# Patient Record
Sex: FEMALE | Race: WHITE | NOT HISPANIC OR LATINO | Employment: OTHER | URBAN - METROPOLITAN AREA
[De-identification: names, ages, dates, MRNs, and addresses within clinical notes are randomized per-mention and may not be internally consistent; named-entity substitution may affect disease eponyms.]

---

## 2017-01-05 DIAGNOSIS — I48.91 ATRIAL FIBRILLATION, UNSPECIFIED TYPE: Primary | ICD-10-CM

## 2017-01-09 ENCOUNTER — OFFICE VISIT (OUTPATIENT)
Dept: ELECTROPHYSIOLOGY | Facility: CLINIC | Age: 82
End: 2017-01-09
Payer: MEDICARE

## 2017-01-09 ENCOUNTER — CLINICAL SUPPORT (OUTPATIENT)
Dept: ELECTROPHYSIOLOGY | Facility: CLINIC | Age: 82
End: 2017-01-09
Payer: MEDICARE

## 2017-01-09 ENCOUNTER — HOSPITAL ENCOUNTER (OUTPATIENT)
Dept: CARDIOLOGY | Facility: CLINIC | Age: 82
Discharge: HOME | End: 2017-01-09
Payer: MEDICARE

## 2017-01-09 VITALS — HEIGHT: 63 IN | BODY MASS INDEX: 28.35 KG/M2 | WEIGHT: 160 LBS | HEART RATE: 71 BPM

## 2017-01-09 DIAGNOSIS — I48.91 ATRIAL FIBRILLATION: ICD-10-CM

## 2017-01-09 DIAGNOSIS — I44.2 COMPLETE ATRIOVENTRICULAR BLOCK: ICD-10-CM

## 2017-01-09 DIAGNOSIS — I44.2 COMPLETE AV BLOCK: ICD-10-CM

## 2017-01-09 DIAGNOSIS — Z95.0 PRESENCE OF CARDIAC PACEMAKER: ICD-10-CM

## 2017-01-09 DIAGNOSIS — E78.5 HYPERLIPIDEMIA, UNSPECIFIED HYPERLIPIDEMIA TYPE: ICD-10-CM

## 2017-01-09 DIAGNOSIS — K21.9 GASTROESOPHAGEAL REFLUX DISEASE WITHOUT ESOPHAGITIS: ICD-10-CM

## 2017-01-09 DIAGNOSIS — I48.21 PERMANENT ATRIAL FIBRILLATION: Primary | ICD-10-CM

## 2017-01-09 DIAGNOSIS — Z95.0 CARDIAC PACEMAKER IN SITU: ICD-10-CM

## 2017-01-09 DIAGNOSIS — I10 ESSENTIAL HYPERTENSION: ICD-10-CM

## 2017-01-09 DIAGNOSIS — I48.91 ATRIAL FIBRILLATION, UNSPECIFIED TYPE: ICD-10-CM

## 2017-01-09 DIAGNOSIS — G81.91 RIGHT HEMIPARESIS: ICD-10-CM

## 2017-01-09 DIAGNOSIS — I63.312 CEREBRAL INFARCTION DUE TO THROMBOSIS OF LEFT MIDDLE CEREBRAL ARTERY: ICD-10-CM

## 2017-01-09 PROCEDURE — 99214 OFFICE O/P EST MOD 30 MIN: CPT | Mod: S$PBB,,, | Performed by: INTERNAL MEDICINE

## 2017-01-09 PROCEDURE — 99999 PR PBB SHADOW E&M-EST. PATIENT-LVL III: CPT | Mod: PBBFAC,,, | Performed by: INTERNAL MEDICINE

## 2017-01-09 PROCEDURE — 99213 OFFICE O/P EST LOW 20 MIN: CPT | Mod: PBBFAC,25 | Performed by: INTERNAL MEDICINE

## 2017-01-09 PROCEDURE — 93279 PRGRMG DEV EVAL PM/LDLS PM: CPT | Mod: PBBFAC | Performed by: INTERNAL MEDICINE

## 2017-01-09 PROCEDURE — 93010 ELECTROCARDIOGRAM REPORT: CPT | Mod: S$PBB,,, | Performed by: INTERNAL MEDICINE

## 2017-01-09 NOTE — MR AVS SNAPSHOT
[facility]  [address]  [place] [address]  Phone: [phone]  Fax: [phone]                  Mattie Belle   2017 11:40 AM   Office Visit    Description:  Female : 1929   Provider:  Dami Du MD   Department:  [facility]           Reason for Visit     Atrial Fibrillation                To Do List           Future Appointments        Provider Department Dept Phone    2/15/2017 10:40 AM TELEPHONE CHECK, PACEM[facility] [phone]    5/15/2017 8:10 AM LAB, [facility] [phone]    5/15/2017 11:00 AM Yunior Pagan MD [facility] [phone]      Goals (5 Years of Data)     None      [facility]     [facility] Nurse Care Line -  Assistance  Registered nurses in the [facility] provide clinical advisement, health education, appointment booking, and other advisory services.  Call for this free service at [phone].             Medications           Message regarding Medications     Verify the changes and/or additions to your medication regime listed below are the same as discussed with your clinician today.  If any of these changes or additions are incorrect, please notify your healthcare provider.        STOP taking these medications     atorvastatin (LIPITOR) 80 MG tablet TAKE 1 TABLET DAILY    guaifenesin (MUCINEX) 600 mg 12 hr tablet Take 1 tablet (600 mg total) by mouth 2 (two) times daily.    guaifenesin-codeine 100-10 mg/5 ml (TUSSI-ORGANIDIN NR)  mg/5 mL syrup Take 5 mLs by mouth every 6 (six) hours as needed for Cough.    magnesium salicylate-caffeine 162.5-50 mg Tab Take 1 tablet by mouth daily as needed.           Verify that the below list of medications is an accurate representation of the medications you are currently taking.  If none reported, the list may be blank. If incorrect, please contact your healthcare provider. Carry  "this list with you in case of emergency.           Current Medications     ANTIOX #8/OM3/DHA/EPA/LUT/ZEAX (PRESERVISION AREDS 2 ORAL) Take 1 tablet by mouth once daily.      CARAFATE 100 mg/mL suspension TAKE 10 ML (1 GRAM TOTAL) EVERY 6 HOURS    carvedilol (COREG) 25 MG tablet Take 1 tablet (25 mg total) by mouth 2 (two) times daily.    cholecalciferol, vitamin D3, (VITAMIN D3) 5,000 unit Tab Take 1 capsule by mouth once daily.      clopidogrel (PLAVIX) 75 mg tablet TAKE 1 TABLET DAILY    ELIQUIS 5 mg Tab TAKE 1 TABLET TWICE A DAY    nitroGLYCERIN 0.4 MG/DOSE TL SPRY (NITROLINGUAL) 400 mcg/spray spray Place 1 spray under the tongue every 5 (five) minutes as needed for Chest pain (max 3 doses). Max 3 doses    pantoprazole (PROTONIX) 40 MG tablet TAKE 1 TABLET DAILY    ranitidine (ZANTAC) 300 MG capsule     spironolactone (ALDACTONE) 25 MG tablet AT THE START OF THERAPY TAKE ONE-HALF TABLET (12.5MG) DAILY.  LAB WORK IN 10 TO 14 DAYS WITH POSSIBLE DOSE INCREASE    VIT C/VIT E/LUTEIN/MIN/OMEGA-3 (OCUVITE ORAL) Take by mouth.           Clinical Reference Information           Vital Signs - Last Recorded  Most recent update: 1/9/2017 11:57 AM by Nba Hensley MA    Pulse Ht Wt LMP BMI    71 5' 3" (1.6 m) 72.6 kg (160 lb) (LMP Unknown) 28.34 kg/m2      Allergies as of 1/9/2017     No Known Allergies      Immunizations Administered on Date of Encounter - 1/9/2017     None      "
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
## 2017-02-14 RX ORDER — CARVEDILOL 25 MG/1
TABLET ORAL
Qty: 180 TABLET | Refills: 2 | Status: SHIPPED | OUTPATIENT
Start: 2017-02-14 | End: 2017-11-14 | Stop reason: SDUPTHER

## 2017-02-15 ENCOUNTER — CLINICAL SUPPORT (OUTPATIENT)
Dept: ELECTROPHYSIOLOGY | Facility: CLINIC | Age: 82
End: 2017-02-15
Payer: MEDICARE

## 2017-02-15 DIAGNOSIS — I44.2 COMPLETE AV BLOCK: ICD-10-CM

## 2017-02-15 DIAGNOSIS — Z95.0 CARDIAC PACEMAKER IN SITU: ICD-10-CM

## 2017-02-15 PROCEDURE — 93293 PM PHONE R-STRIP DEVICE EVAL: CPT | Mod: PBBFAC | Performed by: INTERNAL MEDICINE

## 2017-03-13 RX ORDER — APIXABAN 5 MG/1
TABLET, FILM COATED ORAL
Qty: 180 TABLET | Refills: 1 | Status: SHIPPED | OUTPATIENT
Start: 2017-03-13 | End: 2017-09-06 | Stop reason: SDUPTHER

## 2017-03-16 ENCOUNTER — CLINICAL SUPPORT (OUTPATIENT)
Dept: ELECTROPHYSIOLOGY | Facility: CLINIC | Age: 82
End: 2017-03-16
Payer: MEDICARE

## 2017-03-16 DIAGNOSIS — Z95.0 CARDIAC PACEMAKER IN SITU: Primary | ICD-10-CM

## 2017-03-16 DIAGNOSIS — I44.2 COMPLETE AV BLOCK: ICD-10-CM

## 2017-03-16 DIAGNOSIS — I48.21 PERMANENT ATRIAL FIBRILLATION: ICD-10-CM

## 2017-03-16 PROCEDURE — 93293 PM PHONE R-STRIP DEVICE EVAL: CPT | Mod: PBBFAC | Performed by: INTERNAL MEDICINE

## 2017-03-28 ENCOUNTER — TELEPHONE (OUTPATIENT)
Dept: INTERNAL MEDICINE | Facility: CLINIC | Age: 82
End: 2017-03-28

## 2017-03-28 NOTE — TELEPHONE ENCOUNTER
Spoke with [facility] Dept whom stated patient's Eliquis has been approved. Daughter (Radha) notified.

## 2017-03-28 NOTE — TELEPHONE ENCOUNTER
----- Message from Ammy Soto sent at 3/28/2017  8:15 AM CDT -----  Contact: tyler daughter  Mattie Belle  MRN: [id number]  : 1929  PCP: Yunior Pagan  Home Phone      [phone]  Work Phone      Not on file.  [place]          [phone]      MESSAGE: [facility] want us to call them concerning scrip  eliquis at  [phone]  Been trying to get meds since the ------------[phone]

## 2017-04-20 ENCOUNTER — CLINICAL SUPPORT (OUTPATIENT)
Dept: ELECTROPHYSIOLOGY | Facility: CLINIC | Age: 82
End: 2017-04-20
Payer: MEDICARE

## 2017-04-20 DIAGNOSIS — I44.2 COMPLETE AV BLOCK: ICD-10-CM

## 2017-04-20 DIAGNOSIS — I48.21 PERMANENT ATRIAL FIBRILLATION: ICD-10-CM

## 2017-04-20 DIAGNOSIS — Z95.0 CARDIAC PACEMAKER IN SITU: ICD-10-CM

## 2017-04-20 PROCEDURE — 93293 PM PHONE R-STRIP DEVICE EVAL: CPT | Mod: PBBFAC | Performed by: INTERNAL MEDICINE

## 2017-05-08 ENCOUNTER — TELEPHONE (OUTPATIENT)
Dept: INTERNAL MEDICINE | Facility: CLINIC | Age: 82
End: 2017-05-08

## 2017-05-08 ENCOUNTER — OFFICE VISIT (OUTPATIENT)
Dept: INTERNAL MEDICINE | Facility: CLINIC | Age: 82
End: 2017-05-08
Payer: MEDICARE

## 2017-05-08 VITALS
SYSTOLIC BLOOD PRESSURE: 126 MMHG | RESPIRATION RATE: 16 BRPM | HEART RATE: 69 BPM | BODY MASS INDEX: 28.35 KG/M2 | HEIGHT: 63 IN | WEIGHT: 160 LBS | DIASTOLIC BLOOD PRESSURE: 62 MMHG

## 2017-05-08 DIAGNOSIS — B36.9 FUNGAL DERMATITIS: Primary | ICD-10-CM

## 2017-05-08 DIAGNOSIS — L03.111 CELLULITIS OF RIGHT AXILLA: ICD-10-CM

## 2017-05-08 PROCEDURE — 1157F ADVNC CARE PLAN IN RCRD: CPT | Mod: 8P | Performed by: NURSE PRACTITIONER

## 2017-05-08 PROCEDURE — 1125F AMNT PAIN NOTED PAIN PRSNT: CPT | Performed by: NURSE PRACTITIONER

## 2017-05-08 PROCEDURE — 99999 PR PBB SHADOW E&M-EST. PATIENT-LVL III: CPT | Mod: PBBFAC,,, | Performed by: NURSE PRACTITIONER

## 2017-05-08 PROCEDURE — 99213 OFFICE O/P EST LOW 20 MIN: CPT | Mod: PBBFAC | Performed by: NURSE PRACTITIONER

## 2017-05-08 PROCEDURE — 1159F MED LIST DOCD IN RCRD: CPT | Performed by: NURSE PRACTITIONER

## 2017-05-08 PROCEDURE — 1160F RVW MEDS BY RX/DR IN RCRD: CPT | Performed by: NURSE PRACTITIONER

## 2017-05-08 PROCEDURE — 99213 OFFICE O/P EST LOW 20 MIN: CPT | Mod: S$PBB | Performed by: NURSE PRACTITIONER

## 2017-05-08 RX ORDER — NYSTATIN 100000 [USP'U]/G
POWDER TOPICAL 3 TIMES DAILY
Qty: 120 G | Refills: 1 | Status: SHIPPED | OUTPATIENT
Start: 2017-05-08 | End: 2017-05-20

## 2017-05-08 RX ORDER — CEPHALEXIN 500 MG/1
500 CAPSULE ORAL EVERY 8 HOURS
Qty: 21 CAPSULE | Refills: 0 | Status: SHIPPED | OUTPATIENT
Start: 2017-05-08 | End: 2017-05-15 | Stop reason: SDUPTHER

## 2017-05-08 RX ORDER — FLUCONAZOLE 150 MG/1
150 TABLET ORAL DAILY
Qty: 3 TABLET | Refills: 0 | Status: SHIPPED | OUTPATIENT
Start: 2017-05-08 | End: 2017-05-11

## 2017-05-08 NOTE — TELEPHONE ENCOUNTER
----- Message from Ammy Soto sent at 2017  9:58 AM CDT -----  Contact: daughter tyler Belle  MRN: [id number]  : 1929  PCP: Yunior Pagan  Home Phone      [phone]  Work Phone      Not on file.  [employer]          [phone]      MESSAGE: appt today [facility]  [phone]

## 2017-05-08 NOTE — MR AVS SNAPSHOT
[facility]  [address] [facility] [address]  Phone: [phone]  Fax: [phone]                  Mattie Belle   2017 1:15 PM   Office Visit    Description:  Female : 1929   Provider:  Tori Maradiaga NP   Department:  [facility]           Reason for Visit     YEAST INFECTION           Diagnoses this Visit        Comments    Fungal dermatitis    -  Primary     Cellulitis of right axilla                To Do List           Future Appointments        Provider Department Dept Phone    5/15/2017 8:10 AM LAB, [facility] [phone]    5/15/2017 11:00 AM Yunior Pagan MD [facility] [phone]    2017 9:40 AM TELEPHONE CHECK, PACEMAKER [address] [facility] [phone]      Goals (5 Years of Data)     None      Follow-Up and Disposition     Return if symptoms worsen or fail to improve.       These Medications        Disp Refills Start End    cephALEXin (KEFLEX) 500 MG capsule 21 capsule 0 2017 5/15/2017    Take 1 capsule (500 mg total) by mouth every 8 (eight) hours. - Oral    Pharmacy: [address] Ph #: [phone]       fluconazole (DIFLUCAN) 150 MG Tab 3 tablet 0 2017    Take 1 tablet (150 mg total) by mouth once daily. - Oral    Pharmacy: [address] Ph #: [phone]       nystatin (MYCOSTATIN) powder 120 g 1 2017     Apply topically 3 (three) times daily. - Topical (Top)    Pharmacy: [address] Ph #: [phone]         [facility] On Call     [facility] On Call Nurse Care Line - 24/7 Assistance  Unless otherwise directed by your provider, please contact [facility], our nurse care line that is available for 24/7 assistance.     Registered nurses in the [facility] provide: appointment  scheduling, clinical advisement, health education, and other advisory services.  Call: [phone] (toll free)               Medications           Message regarding Medications     Verify the changes and/or additions to your medication regime listed below are the same as discussed with your clinician today.  If any of these changes or additions are incorrect, please notify your healthcare provider.        START taking these NEW medications        Refills    cephALEXin (KEFLEX) 500 MG capsule 0    Sig: Take 1 capsule (500 mg total) by mouth every 8 (eight) hours.    Class: Normal    Route: Oral    fluconazole (DIFLUCAN) 150 MG Tab 0    Sig: Take 1 tablet (150 mg total) by mouth once daily.    Class: Normal    Route: Oral    nystatin (MYCOSTATIN) powder 1    Sig: Apply topically 3 (three) times daily.    Class: Print    Route: Topical (Top)      STOP taking these medications     CARAFATE 100 mg/mL suspension TAKE 10 ML (1 GRAM TOTAL) EVERY 6 HOURS    ANTIOX #8/OM3/DHA/EPA/LUT/ZEAX (PRESERVISION AREDS 2 ORAL) Take 1 tablet by mouth once daily.             Verify that the below list of medications is an accurate representation of the medications you are currently taking.  If none reported, the list may be blank. If incorrect, please contact your healthcare provider. Carry this list with you in case of emergency.           Current Medications     carvedilol (COREG) 25 MG tablet TAKE 1 TABLET TWICE A DAY    cholecalciferol, vitamin D3, (VITAMIN D3) 5,000 unit Tab Take 1 capsule by mouth once daily.      clopidogrel (PLAVIX) 75 mg tablet TAKE 1 TABLET DAILY    ELIQUIS 5 mg Tab TAKE 1 TABLET TWICE A DAY    pantoprazole (PROTONIX) 40 MG tablet TAKE 1 TABLET DAILY    ranitidine (ZANTAC) 300 MG capsule Take 300 mg by mouth Daily.     spironolactone (ALDACTONE) 25 MG tablet AT THE START OF THERAPY TAKE ONE-HALF TABLET (12.5MG) DAILY.  LAB WORK IN 10 TO 14 DAYS WITH POSSIBLE DOSE INCREASE    VIT C/VIT E/LUTEIN/MIN/OMEGA-3  "(OCUVITE ORAL) Take by mouth.    cephALEXin (KEFLEX) 500 MG capsule Take 1 capsule (500 mg total) by mouth every 8 (eight) hours.    fluconazole (DIFLUCAN) 150 MG Tab Take 1 tablet (150 mg total) by mouth once daily.    nitroGLYCERIN 0.4 MG/DOSE TL SPRY (NITROLINGUAL) 400 mcg/spray spray Place 1 spray under the tongue every 5 (five) minutes as needed for Chest pain (max 3 doses). Max 3 doses    nystatin (MYCOSTATIN) powder Apply topically 3 (three) times daily.           Clinical Reference Information           Your Vitals Were     BP Pulse Resp Height Weight Last Period    126/62 (BP Location: Left arm, Patient Position: Sitting, BP Method: Manual) 69 16 5' 3" (1.6 m) 72.6 kg (160 lb) (LMP Unknown)    BMI                28.34 kg/m2          Blood Pressure          Most Recent Value    BP  126/62      Allergies as of 5/8/2017     No Known Allergies      Immunizations Administered on Date of Encounter - 5/8/2017     None      Language Assistance Services     ATTENTION: Language assistance services are available, free of charge. Please call [phone].      ATENCIÓN: Si habla karel, tiene a benson disposición servicios gratuitos de asistencia lingüística. Llame al [phone].     TIFFANIE Ý: N?u b?n nói Ti?ng Vi?t, có các d?ch v? h? tr? ngôn ng? mi?n phí dành cho b?n. G?i s? [phone].         [facility] complies with applicable Federal civil rights laws and does not discriminate on the basis of race, color, national origin, age, disability, or sex.        "

## 2017-05-08 NOTE — PROGRESS NOTES
Subjective:       Patient ID: Mattie Belle is a 88 y.o. female.    Chief Complaint: YEAST INFECTION (Yeast under breats and on right arm)    HPI: pt new to me presents with c/o drainage from under breast and right arm. Reports seen by After hours clinic and dx with yeast infection and treated with nystatin powder. She is contracted to the right arm secondary to h/o cva.     Review of Systems   Constitutional: Negative for chills, diaphoresis, fatigue and fever.   Respiratory: Negative for cough, shortness of breath and wheezing.    Cardiovascular: Negative for chest pain.   Gastrointestinal: Negative for abdominal distention, abdominal pain, constipation, diarrhea, nausea and vomiting.   Musculoskeletal: Negative for arthralgias, back pain and myalgias.   Skin: Positive for rash.   Neurological: Negative for headaches.   Psychiatric/Behavioral: Negative for sleep disturbance.       Objective:      Physical Exam   Constitutional: She is oriented to person, place, and time. She appears well-developed and well-nourished.   HENT:   Head: Normocephalic and atraumatic.   Cardiovascular: Normal rate, regular rhythm and normal heart sounds.    Pulmonary/Chest: Effort normal and breath sounds normal.   Musculoskeletal: She exhibits no edema.   No passive rom to right shoulder. i can barely get any airspace in between her right arm and her lateral side/breast.    Neurological: She is alert and oriented to person, place, and time.   Skin: Skin is warm and dry.        Psychiatric: She has a normal mood and affect. Her behavior is normal. Judgment and thought content normal.   Nursing note and vitals reviewed.      Assessment:       1. Fungal dermatitis    2. Cellulitis of right axilla        Plan:   1. Fungal dermatitis  Advised on keeping dry and keeping cool.   - fluconazole (DIFLUCAN) 150 MG Tab; Take 1 tablet (150 mg total) by mouth once daily.  Dispense: 3 tablet; Refill: 0  - nystatin (MYCOSTATIN) powder; Apply topically  3 (three) times daily.  Dispense: 120 g; Refill: 1    2. Cellulitis of right axilla  If any worse, please call. Any fevers, seek treatment.   - cephALEXin (KEFLEX) 500 MG capsule; Take 1 capsule (500 mg total) by mouth every 8 (eight) hours.  Dispense: 21 capsule; Refill: 0

## 2017-05-15 ENCOUNTER — LAB VISIT (OUTPATIENT)
Dept: LAB | Facility: HOSPITAL | Age: 82
End: 2017-05-15
Attending: INTERNAL MEDICINE
Payer: MEDICARE

## 2017-05-15 ENCOUNTER — OFFICE VISIT (OUTPATIENT)
Dept: INTERNAL MEDICINE | Facility: CLINIC | Age: 82
End: 2017-05-15
Payer: MEDICARE

## 2017-05-15 VITALS
RESPIRATION RATE: 14 BRPM | DIASTOLIC BLOOD PRESSURE: 52 MMHG | HEIGHT: 63 IN | SYSTOLIC BLOOD PRESSURE: 100 MMHG | OXYGEN SATURATION: 92 % | WEIGHT: 160.06 LBS | HEART RATE: 70 BPM | BODY MASS INDEX: 28.36 KG/M2

## 2017-05-15 DIAGNOSIS — G81.91 RIGHT HEMIPARESIS: ICD-10-CM

## 2017-05-15 DIAGNOSIS — E78.5 HYPERLIPIDEMIA, UNSPECIFIED HYPERLIPIDEMIA TYPE: ICD-10-CM

## 2017-05-15 DIAGNOSIS — I10 ESSENTIAL HYPERTENSION: Primary | ICD-10-CM

## 2017-05-15 DIAGNOSIS — I10 ESSENTIAL HYPERTENSION: ICD-10-CM

## 2017-05-15 DIAGNOSIS — L03.113 CELLULITIS OF ARM, RIGHT: ICD-10-CM

## 2017-05-15 DIAGNOSIS — K21.9 GASTROESOPHAGEAL REFLUX DISEASE WITHOUT ESOPHAGITIS: ICD-10-CM

## 2017-05-15 DIAGNOSIS — I48.91 ATRIAL FIBRILLATION, UNSPECIFIED TYPE: ICD-10-CM

## 2017-05-15 DIAGNOSIS — L03.111 CELLULITIS OF RIGHT AXILLA: ICD-10-CM

## 2017-05-15 DIAGNOSIS — D64.9 ANEMIA, UNSPECIFIED TYPE: ICD-10-CM

## 2017-05-15 LAB
ALBUMIN SERPL BCP-MCNC: 3.3 G/DL
ALP SERPL-CCNC: 70 U/L
ALT SERPL W/O P-5'-P-CCNC: 10 U/L
ANION GAP SERPL CALC-SCNC: 9 MMOL/L
AST SERPL-CCNC: 16 U/L
BASOPHILS # BLD AUTO: 0.02 K/UL
BASOPHILS NFR BLD: 0.3 %
BILIRUB SERPL-MCNC: 0.7 MG/DL
BUN SERPL-MCNC: 14 MG/DL
CALCIUM SERPL-MCNC: 9.4 MG/DL
CHLORIDE SERPL-SCNC: 103 MMOL/L
CHOLEST/HDLC SERPL: 2.1 {RATIO}
CO2 SERPL-SCNC: 28 MMOL/L
CREAT SERPL-MCNC: 1.1 MG/DL
DIFFERENTIAL METHOD: ABNORMAL
EOSINOPHIL # BLD AUTO: 0.1 K/UL
EOSINOPHIL NFR BLD: 1.4 %
ERYTHROCYTE [DISTWIDTH] IN BLOOD BY AUTOMATED COUNT: 15.3 %
EST. GFR  (AFRICAN AMERICAN): 52 ML/MIN/1.73 M^2
EST. GFR  (NON AFRICAN AMERICAN): 45 ML/MIN/1.73 M^2
GLUCOSE SERPL-MCNC: 115 MG/DL
HCT VFR BLD AUTO: 32.1 %
HDL/CHOLESTEROL RATIO: 47.3 %
HDLC SERPL-MCNC: 35 MG/DL
HDLC SERPL-MCNC: 74 MG/DL
HGB BLD-MCNC: 9.9 G/DL
LDLC SERPL CALC-MCNC: 26.4 MG/DL
LYMPHOCYTES # BLD AUTO: 1.1 K/UL
LYMPHOCYTES NFR BLD: 15.8 %
MCH RBC QN AUTO: 27.9 PG
MCHC RBC AUTO-ENTMCNC: 30.8 %
MCV RBC AUTO: 90 FL
MONOCYTES # BLD AUTO: 0.5 K/UL
MONOCYTES NFR BLD: 7.8 %
NEUTROPHILS # BLD AUTO: 5.2 K/UL
NEUTROPHILS NFR BLD: 74.7 %
NONHDLC SERPL-MCNC: 39 MG/DL
PLATELET # BLD AUTO: 266 K/UL
PMV BLD AUTO: 9.8 FL
POTASSIUM SERPL-SCNC: 4.6 MMOL/L
PROT SERPL-MCNC: 6.7 G/DL
RBC # BLD AUTO: 3.55 M/UL
SODIUM SERPL-SCNC: 140 MMOL/L
TRIGL SERPL-MCNC: 63 MG/DL
TSH SERPL DL<=0.005 MIU/L-ACNC: 2.52 UIU/ML
WBC # BLD AUTO: 6.9 K/UL

## 2017-05-15 PROCEDURE — 99999 PR PBB SHADOW E&M-EST. PATIENT-LVL III: CPT | Mod: PBBFAC,,, | Performed by: INTERNAL MEDICINE

## 2017-05-15 PROCEDURE — 99214 OFFICE O/P EST MOD 30 MIN: CPT | Mod: S$PBB | Performed by: INTERNAL MEDICINE

## 2017-05-15 PROCEDURE — 99213 OFFICE O/P EST LOW 20 MIN: CPT | Mod: PBBFAC | Performed by: INTERNAL MEDICINE

## 2017-05-15 PROCEDURE — 1157F ADVNC CARE PLAN IN RCRD: CPT | Mod: 8P | Performed by: INTERNAL MEDICINE

## 2017-05-15 PROCEDURE — 1159F MED LIST DOCD IN RCRD: CPT | Performed by: INTERNAL MEDICINE

## 2017-05-15 PROCEDURE — 1160F RVW MEDS BY RX/DR IN RCRD: CPT | Performed by: INTERNAL MEDICINE

## 2017-05-15 RX ORDER — CEPHALEXIN 500 MG/1
500 CAPSULE ORAL EVERY 8 HOURS
Qty: 21 CAPSULE | Refills: 0 | Status: SHIPPED | OUTPATIENT
Start: 2017-05-15 | End: 2017-05-22

## 2017-05-15 RX ORDER — FERROUS SULFATE 325(65) MG
325 TABLET ORAL 3 TIMES DAILY
Qty: 90 TABLET | Refills: 5 | Status: SHIPPED | OUTPATIENT
Start: 2017-05-15 | End: 2017-06-14

## 2017-05-15 RX ORDER — CEPHALEXIN 500 MG/1
500 CAPSULE ORAL 3 TIMES DAILY
Qty: 30 CAPSULE | Refills: 0 | Status: SHIPPED | OUTPATIENT
Start: 2017-05-15 | End: 2017-05-26

## 2017-05-15 NOTE — PROGRESS NOTES
Subjective:       Patient ID: Mattie Belle is a 88 y.o. female.    Chief Complaint: Hyperlipidemia (follow up with lab review) and Hypertension    HPI Comments: Mattie Belle is a 88 y.o. female who presents for ch anemia, CHF ,  Hypertension, and Hyperlipidemia follow up. Labs were reviewed with patient today.  Anemia highlighted with patient and daughter . Not willing to do scopes and work up     Hyperlipidemia   This is a chronic problem. The problem is controlled. Recent lipid tests were reviewed and are low. She has no history of obesity. Pertinent negatives include no chest pain, myalgias or shortness of breath.   Hypertension   This is a chronic problem. The current episode started more than 1 year ago. The problem is unchanged. The problem is controlled. Pertinent negatives include no blurred vision, chest pain, headaches, orthopnea, palpitations, PND or shortness of breath. Past treatments include beta blockers. The current treatment provides moderate improvement. Compliance problems include diet.    Medication Refill   Associated symptoms include a rash and weakness. Pertinent negatives include no abdominal pain, arthralgias, chest pain, chills, congestion, coughing, diaphoresis, fatigue, fever, headaches, myalgias, nausea, numbness, sore throat or vomiting.   Gastroesophageal Reflux   She reports no abdominal pain, no chest pain, no choking, no coughing, no nausea, no sore throat or no wheezing. Pertinent negatives include no fatigue.     Review of Systems   Constitutional: Negative for chills, diaphoresis, fatigue and fever.   HENT: Negative for congestion and sore throat.    Eyes: Negative for blurred vision.   Respiratory: Negative for cough, choking, shortness of breath and wheezing.    Cardiovascular: Negative for chest pain, palpitations, orthopnea and PND.   Gastrointestinal: Negative for abdominal distention, abdominal pain, constipation, diarrhea, nausea and vomiting.   Musculoskeletal: Negative  for arthralgias, back pain and myalgias.   Skin: Positive for rash.   Neurological: Positive for weakness. Negative for numbness and headaches.   Psychiatric/Behavioral: Negative for sleep disturbance.       Objective:      Physical Exam   Constitutional: She is oriented to person, place, and time. She appears well-developed and well-nourished.   HENT:   Head: Normocephalic and atraumatic.   Cardiovascular: Normal rate, regular rhythm and normal heart sounds.    Pulmonary/Chest: Effort normal and breath sounds normal.   Musculoskeletal: She exhibits no edema.   No passive rom to right shoulder. i can barely get any airspace in between her right arm and her lateral side/breast.    Neurological: She is alert and oriented to person, place, and time.   Skin: Skin is warm and dry.        mild erythema left    Psychiatric: She has a normal mood and affect. Her behavior is normal. Judgment and thought content normal.   Nursing note and vitals reviewed.      Assessment:       1. Essential hypertension    2. Hyperlipidemia, unspecified hyperlipidemia type    3. Atrial fibrillation, unspecified type    4. Anemia, unspecified type        Plan:   Mattie was seen today for hyperlipidemia and hypertension.    Diagnoses and all orders for this visit:    Essential hypertension  -     CBC auto differential; Future  -     Comprehensive metabolic panel; Future    Well controlled.  Continue same medication and dose.  1. Keep weight close to ideal body weight.   2.   Avoid high salt foods (olives, pickles, smoked meats, salted potato chips, etc.).   Do not add salt to your food at the table.   Use only small amounts of salt when cooking.   3. Begin an exercise program. Discuss with your doctor what type of exercise program would be best for you. It doesn't have to be difficult. Even brisk walking for 20 minutes three times a week is a good form of exercise.   4. Avoid medicines which contain heart stimulants. This includes many cold and  sinus decongestant pills and sprays as well as diet pills. Check the warnings about hypertension on the label. Stimulants such as amphetamine or cocaine could be lethal for someone with hypertension. Never take these.    Hyperlipidemia, unspecified hyperlipidemia type  -     Lipid panel; Future  -     TSH; Future  Limit the cholesterol in your diet to less than 300 mg per day.   Fats should contribute no more than 20 to 35% of your daily calories.   Less than 7 to 10% of your calories should come from saturated fat.   Avoid saturated fat products e.g., Butter, some oils, meat, and poultry fat contain a lot of saturated fat.   Check food labels for fat and cholesterol content. Choose the foods with less fat per serving.   Limit the amount of butter and margarine you eat.   Use salad dressings and margarine made with polyunsaturated and monounsaturated fats.   Use egg whites or egg substitutes rather than whole eggs.   Replace whole-milk dairy products with nonfat or low-fat milk, cheese, spreads, and yogurt.   Eat skinless chicken, turkey, fish, and meatless entrees more often than red meat.   Choose lean cuts of meat and trim off all visible fat. Keep portion sizes moderate.   Avoid fatty desserts such as ice cream, cream-filled cakes, and cheesecakes. Choose fresh fruits, nonfat frozen yogurt, Popsicles, etc.   Reduce the amount of fried foods, vending machine food, and fast food you eat.   Eat fruits and vegetables (especially fresh fruits and leafy vegetables), beans, and whole grains daily. The fiber in these foods helps lower cholesterol.   Look for low-fat or nonfat varieties of the foods you like to eat, or look for substitutes.   You may need to exercise 60 minutes a day to prevent weight gain and 90 minutes a day to lose weight.  Atrial fibrillation, unspecified type  -     TSH; Future  The current medical regimen is effective;  continue present plan and medications.  Anemia, unspecified type  -     ferrous  sulfate 325 mg (65 mg iron) Tab tablet; Take 1 tablet (325 mg total) by mouth 3 (three) times daily.  -     CBC auto differential; Future  She will not do work up    Cellulitis of arm, right  -     cephALEXin (KEFLEX) 500 MG capsule; Take 1 capsule (500 mg total) by mouth 3 (three) times daily.

## 2017-05-15 NOTE — MR AVS SNAPSHOT
[facility]  [address] [facility] [address]  Phone: [phone]  Fax: [phone]                  Mattie Belle   5/15/2017 11:00 AM   Office Visit    Description:  Female : 1929   Provider:  Yunior Pagan MD   Department:  [facility]           Reason for Visit     Hyperlipidemia     Hypertension           Diagnoses this Visit        Comments    Essential hypertension    -  Primary     Hyperlipidemia, unspecified hyperlipidemia type         Atrial fibrillation, unspecified type         Anemia, unspecified type         Cellulitis of arm, right                To Do List           Future Appointments        Provider Department Dept Phone    5/15/2017 11:00 AM Yunior Pagan MD [facility] [phone]    2017 9:40 AM TELEPHONE CHECK, PACEMAKER [facility] - Arrhythmia [phone]    11/15/2017 8:30 AM NURSE, Health system [phone]    2017 10:30 AM Yunior Pagan MD [facility] [phone]      Goals (5 Years of Data)     None      Follow-Up and Disposition     Return in about 6 months (around 11/15/2017).    Follow-up and Disposition History       These Medications        Disp Refills Start End    ferrous sulfate 325 mg (65 mg iron) Tab tablet 90 tablet 5 5/15/2017 2017    Take 1 tablet (325 mg total) by mouth 3 (three) times daily. - Oral    Pharmacy: [facility] - [address] Ph #: [phone]       cephALEXin (KEFLEX) 500 MG capsule 30 capsule 0 5/15/2017 2017    Take 1 capsule (500 mg total) by mouth 3 (three) times daily. - Oral    Pharmacy: [facility] - [address] Ph #: [phone]         [facility]     [facility] -  Assistance  Unless otherwise directed by your provider, please contact [facility], our nurse care line that is  available for 24/7 assistance.     Registered nurses in the [facility] provide: appointment scheduling, clinical advisement, health education, and other advisory services.  Call: [phone] (toll free)               Medications           Message regarding Medications     Verify the changes and/or additions to your medication regime listed below are the same as discussed with your clinician today.  If any of these changes or additions are incorrect, please notify your healthcare provider.        START taking these NEW medications        Refills    ferrous sulfate 325 mg (65 mg iron) Tab tablet 5    Sig: Take 1 tablet (325 mg total) by mouth 3 (three) times daily.    Class: Normal    Route: Oral    cephALEXin (KEFLEX) 500 MG capsule 0    Sig: Take 1 capsule (500 mg total) by mouth 3 (three) times daily.    Class: Normal    Route: Oral           Verify that the below list of medications is an accurate representation of the medications you are currently taking.  If none reported, the list may be blank. If incorrect, please contact your healthcare provider. Carry this list with you in case of emergency.           Current Medications     carvedilol (COREG) 25 MG tablet TAKE 1 TABLET TWICE A DAY    cephALEXin (KEFLEX) 500 MG capsule Take 1 capsule (500 mg total) by mouth every 8 (eight) hours.    cholecalciferol, vitamin D3, (VITAMIN D3) 5,000 unit Tab Take 1 capsule by mouth once daily.      clopidogrel (PLAVIX) 75 mg tablet TAKE 1 TABLET DAILY    ELIQUIS 5 mg Tab TAKE 1 TABLET TWICE A DAY    nystatin (MYCOSTATIN) powder Apply topically 3 (three) times daily.    pantoprazole (PROTONIX) 40 MG tablet TAKE 1 TABLET DAILY    ranitidine (ZANTAC) 300 MG capsule Take 300 mg by mouth Daily.     spironolactone (ALDACTONE) 25 MG tablet AT THE START OF THERAPY TAKE ONE-HALF TABLET (12.5MG) DAILY.  LAB WORK IN 10 TO 14 DAYS WITH POSSIBLE DOSE INCREASE    VIT C/VIT E/LUTEIN/MIN/OMEGA-3 (OCUVITE ORAL) Take by mouth.     "cephALEXin (KEFLEX) 500 MG capsule Take 1 capsule (500 mg total) by mouth 3 (three) times daily.    ferrous sulfate 325 mg (65 mg iron) Tab tablet Take 1 tablet (325 mg total) by mouth 3 (three) times daily.    nitroGLYCERIN 0.4 MG/DOSE TL SPRY (NITROLINGUAL) 400 mcg/spray spray Place 1 spray under the tongue every 5 (five) minutes as needed for Chest pain (max 3 doses). Max 3 doses           Clinical Reference Information           Your Vitals Were     BP Pulse Resp Height Weight Last Period    100/52 70 14 5' 3" (1.6 m) 72.6 kg (160 lb 0.9 oz) (LMP Unknown)    SpO2 BMI             92% 28.35 kg/m2         Blood Pressure          Most Recent Value    BP  (!)  100/52      Allergies as of 5/15/2017     No Known Allergies      Immunizations Administered on Date of Encounter - 5/15/2017     None      Orders Placed During Today's Visit     Future Labs/Procedures Expected by Expires    CBC auto differential  11/11/2017 (Approximate) 5/15/2018    Comprehensive metabolic panel  11/11/2017 (Approximate) 5/15/2018    Lipid panel  11/11/2017 (Approximate) 5/15/2018    TSH  11/11/2017 (Approximate) 5/15/2018      Language Assistance Services     ATTENTION: Language assistance services are available, free of charge. Please call [phone].      ATENCIÓN: Si leah karel, tiene a benson disposición servicios gratuitos de asistencia lingüística. Llame al [phone].     CHÚ Ý: N?u b?n nói Ti?ng Vi?t, có các d?ch v? h? tr? ngôn ng? mi?n phí dành cho b?n. G?i s? [phone].         [facility] complies with applicable Federal civil rights laws and does not discriminate on the basis of race, color, national origin, age, disability, or sex.        "

## 2017-05-18 RX ORDER — CLOPIDOGREL BISULFATE 75 MG/1
TABLET ORAL
Qty: 90 TABLET | Refills: 0 | Status: SHIPPED | OUTPATIENT
Start: 2017-05-18 | End: 2017-08-17 | Stop reason: SDUPTHER

## 2017-05-18 RX ORDER — ATORVASTATIN CALCIUM 80 MG/1
TABLET, FILM COATED ORAL
Qty: 90 TABLET | Refills: 0 | Status: SHIPPED | OUTPATIENT
Start: 2017-05-18 | End: 2017-08-17 | Stop reason: SDUPTHER

## 2017-05-20 ENCOUNTER — HOSPITAL ENCOUNTER (EMERGENCY)
Facility: HOSPITAL | Age: 82
Discharge: HOME | End: 2017-05-20
Attending: SURGERY
Payer: MEDICARE

## 2017-05-20 VITALS
BODY MASS INDEX: 30.11 KG/M2 | WEIGHT: 170 LBS | HEART RATE: 70 BPM | OXYGEN SATURATION: 95 % | RESPIRATION RATE: 16 BRPM | SYSTOLIC BLOOD PRESSURE: 128 MMHG | TEMPERATURE: 97 F | DIASTOLIC BLOOD PRESSURE: 58 MMHG

## 2017-05-20 DIAGNOSIS — L03.90 CELLULITIS, UNSPECIFIED CELLULITIS SITE: ICD-10-CM

## 2017-05-20 DIAGNOSIS — B37.2 YEAST INFECTION OF THE SKIN: Primary | ICD-10-CM

## 2017-05-20 LAB
ALBUMIN SERPL BCP-MCNC: 3.1 G/DL
ALP SERPL-CCNC: 73 U/L
ALT SERPL W/O P-5'-P-CCNC: 8 U/L
ANION GAP SERPL CALC-SCNC: 8 MMOL/L
AST SERPL-CCNC: 14 U/L
BASOPHILS # BLD AUTO: 0.02 K/UL
BASOPHILS NFR BLD: 0.2 %
BILIRUB SERPL-MCNC: 0.7 MG/DL
BUN SERPL-MCNC: 17 MG/DL
CALCIUM SERPL-MCNC: 9.3 MG/DL
CHLORIDE SERPL-SCNC: 105 MMOL/L
CO2 SERPL-SCNC: 27 MMOL/L
CREAT SERPL-MCNC: 1.2 MG/DL
DIFFERENTIAL METHOD: ABNORMAL
EOSINOPHIL # BLD AUTO: 0.2 K/UL
EOSINOPHIL NFR BLD: 1.8 %
ERYTHROCYTE [DISTWIDTH] IN BLOOD BY AUTOMATED COUNT: 16.1 %
EST. GFR  (AFRICAN AMERICAN): 47 ML/MIN/1.73 M^2
EST. GFR  (NON AFRICAN AMERICAN): 40 ML/MIN/1.73 M^2
GLUCOSE SERPL-MCNC: 139 MG/DL
HCT VFR BLD AUTO: 31.7 %
HGB BLD-MCNC: 9.8 G/DL
LYMPHOCYTES # BLD AUTO: 1.2 K/UL
LYMPHOCYTES NFR BLD: 12.3 %
MCH RBC QN AUTO: 28.1 PG
MCHC RBC AUTO-ENTMCNC: 30.9 %
MCV RBC AUTO: 91 FL
MONOCYTES # BLD AUTO: 1 K/UL
MONOCYTES NFR BLD: 10.4 %
NEUTROPHILS # BLD AUTO: 7.1 K/UL
NEUTROPHILS NFR BLD: 75.3 %
PLATELET # BLD AUTO: 226 K/UL
PMV BLD AUTO: 9.9 FL
POTASSIUM SERPL-SCNC: 4.6 MMOL/L
PROT SERPL-MCNC: 6.4 G/DL
RBC # BLD AUTO: 3.49 M/UL
SODIUM SERPL-SCNC: 140 MMOL/L
WBC # BLD AUTO: 9.45 K/UL

## 2017-05-20 PROCEDURE — 96372 THER/PROPH/DIAG INJ SC/IM: CPT

## 2017-05-20 PROCEDURE — 25000003 PHARM REV CODE 250: Performed by: SURGERY

## 2017-05-20 PROCEDURE — 85025 COMPLETE CBC W/AUTO DIFF WBC: CPT

## 2017-05-20 PROCEDURE — 80053 COMPREHEN METABOLIC PANEL: CPT

## 2017-05-20 PROCEDURE — 36415 COLL VENOUS BLD VENIPUNCTURE: CPT

## 2017-05-20 PROCEDURE — 99284 EMERGENCY DEPT VISIT MOD MDM: CPT | Mod: 25

## 2017-05-20 RX ORDER — NYSTATIN 100000 U/G
CREAM TOPICAL 2 TIMES DAILY
Qty: 1 TUBE | Refills: 0 | Status: SHIPPED | OUTPATIENT
Start: 2017-05-20 | End: 2017-06-14 | Stop reason: SDUPTHER

## 2017-05-20 RX ORDER — CLINDAMYCIN HYDROCHLORIDE 300 MG/1
300 CAPSULE ORAL 4 TIMES DAILY
Qty: 28 CAPSULE | Refills: 0 | Status: SHIPPED | OUTPATIENT
Start: 2017-05-20 | End: 2017-05-26 | Stop reason: SINTOL

## 2017-05-20 RX ORDER — CLINDAMYCIN PHOSPHATE 150 MG/ML
600 INJECTION, SOLUTION INTRAVENOUS
Status: COMPLETED | OUTPATIENT
Start: 2017-05-20 | End: 2017-05-20

## 2017-05-20 RX ORDER — FLUCONAZOLE 150 MG/1
150 TABLET ORAL
Status: COMPLETED | OUTPATIENT
Start: 2017-05-20 | End: 2017-05-20

## 2017-05-20 RX ORDER — TRAMADOL HYDROCHLORIDE 50 MG/1
50 TABLET ORAL EVERY 6 HOURS PRN
Qty: 20 TABLET | Refills: 0 | Status: SHIPPED | OUTPATIENT
Start: 2017-05-20 | End: 2017-05-30

## 2017-05-20 RX ORDER — FLUCONAZOLE 100 MG/1
100 TABLET ORAL DAILY
Qty: 5 TABLET | Refills: 0 | Status: SHIPPED | OUTPATIENT
Start: 2017-05-20 | End: 2017-05-25

## 2017-05-20 RX ORDER — ACETAMINOPHEN AND CODEINE PHOSPHATE 300; 30 MG/1; MG/1
1 TABLET ORAL
Status: COMPLETED | OUTPATIENT
Start: 2017-05-20 | End: 2017-05-20

## 2017-05-20 RX ADMIN — ACETAMINOPHEN AND CODEINE PHOSPHATE 1 TABLET: 300; 30 TABLET ORAL at 03:05

## 2017-05-20 RX ADMIN — FLUCONAZOLE 150 MG: 150 TABLET ORAL at 03:05

## 2017-05-20 RX ADMIN — CLINDAMYCIN PHOSPHATE 600 MG: 150 INJECTION, SOLUTION INTRAMUSCULAR; INTRAVENOUS at 03:05

## 2017-05-20 NOTE — ED TRIAGE NOTES
Assumed care of 88 y.o. female presents to ER ED 02/ED 02A   Chief Complaint   Patient presents with    Cellulitis    to right arm. PCP instructed pt to come to ER. No acute distress noted.

## 2017-05-20 NOTE — DISCHARGE INSTRUCTIONS
Candida Skin Infection (Adult)  Candida is type of yeast. It grows naturally on the skin and in the mouth. If it grows out of control, it can cause an infection. Candida can cause infections in the genital area, skin folds, in the mouth, and under the breasts. Anyone can get this infection. It is more common in a person with a weak immune system, such as from diabetes, HIV, or cancer. Its also more common in someone who has been on antibiotic therapy. And its more common people who are overweight or who have incontinence. Wearing tight-fitting clothing and taking part in activities with lots of skin-to-skin contact can also put you at risk.  Candida causes the skin to become bright red and inflamed. The border of the infected part of the skin is often raised. The infection causes pain and itching. Sometimes the skin peels and bleeds. In the mouth, candida is called thrush, and may cause white thickened areas.  A Candida rash is most often treated with an antifungal cream or ointment. The rash will clear a few days after starting the medicine. Infections that dont go away may need a prescription medicine. In rare cases, a bacterial infection can also occur.  Home care  Your healthcare provider will recommend an antifungal cream or ointment for the rash. He or she may also prescribe a medicine for the itch. Follow all instructions for using these medicines. Dont use cornstarch powder. Cornstarch can cause the Candida infection to get worse.  General care:  · Keep your skin clean by washing the area twice a day.  · Use the cream as directed until your rash is gone. Once the skin has healed, keep it dry to prevent another infection.   · If you are overweight, talk with your healthcare provider about a plan to lose excess weight.  · Avoid clothes that fit tightly.  Follow-up care  Follow up with your healthcare provider, or as advised. Your rash will clear in 7 to 14 days. Call your healthcare provider if the rash  is not gone after 14 days.  When to seek medical advice  Call your healthcare provider right away if any of these occur:  · Pain or redness that gets worse or spreads  · Fluid coming from the skin  · Yellow crusts on the skin  · Fever of 100.4°F (38°C) or higher, or as directed by your healthcare provider  Date Last Reviewed: 9/1/2016  © 7976-9691 [employer]. [address] [place] [address], [place] [address]. All rights reserved. This information is not intended as a substitute for professional medical care. Always follow your healthcare professional's instructions.

## 2017-05-20 NOTE — ED PROVIDER NOTES
[facility]                                        May 20, 2017                   Chief Complaint  88 y.o. female with Cellulitis    History of Present Illness  Mattie Belle presents to the emergency room with right arm and chest wall infection chronically  Pt has been treated for several weeks due to a yeast infection of the right arm and chest wall  Patient has been seen by the nurse practitioner & PCP, was on 3 days of Diflucan just recently  Patient states that local wound care did help, however yeast infection reoccurred this week  Patient has contractures the right upper extremity, right arm rubs up against the right chest wall  Continued moistness in the area, daughter is a retired wound care nurse: Difficult to treat yeast    The history is provided by the patient    Past Medical History   -- Atrial fibrillation    -- CHF (congestive heart failure)    -- Hyperlipidemia    -- Hypertension    -- Stroke      Past Surgical History   -- CHOLECYSTECTOMY     -- HYSTERECTOMY     -- INSERT / REPLACE / REMOVE PACEMAKER        No Known Allergies     Review of Systems and Physical Exam     Review of Systems  -- Constitution - no fever, denies fatigue, no weakness, no chills  -- Eyes - no tearing or redness, no visual disturbance  -- Ear, Nose - no tinnitus or earache, no nasal congestion or discharge  -- Mouth,Throat - no sore throat, no toothache, normal voice, normal swallowing  -- Respiratory - denies cough and congestion, no shortness of breath, no GABRIEL  -- Cardiovascular - denies chest pain, no palpitations, denies claudication  -- Gastrointestinal - denies abdominal pain, nausea, vomiting, or diarrhea  -- Musculoskeletal - denies back pain, negative for myalgias and arthralgias   -- Neurological - no headache, denies weakness or seizure; no LOC  -- Skin - yeast infection of the right arm and chest wall    Vital Signs  -- Her blood pressure is 128/58 (abnormal) and her pulse is 70.   -- Her  respiration is 16 and oxygen saturation is 95%.      Physical Exam  -- Nursing note and vitals reviewed  -- Constitutional: Appears well-developed and well-nourished  -- Head: Atraumatic. Normocephalic. No obvious abnormality  -- Eyes: Pupils are equal and reactive to light. Normal conjunctiva and lids  -- Cardiac: Normal rate, regular rhythm and normal heart sounds  -- Pulmonary: Normal respiratory effort, breath sounds clear to auscultation  -- Abdominal: Soft, no tenderness. Normal bowel sounds. Normal liver edge  -- Musculoskeletal: Normal range of motion, no effusions. Joints stable   -- Neurological: No focal deficits. Showed good interaction with staff  -- Vascular: Posterior tibial, dorsalis pedis and radial pulses 2+ bilaterally    -- Lymphatics: No cervical or peripheral lymphadenopathy. No edema noted  -- Skin: Warm and dry. No evidence of rash or cellulitis    Emergency Room Course     Abnormal lab values  -- Glucose 139 (*)   -- Albumin 3.1 (*)   -- ALT 8 (*)   -- eGFR  40 (*)   -- RBC 3.49 (*)   -- Hemoglobin 9.8 (*)   -- Hematocrit 31.7 (*)   -- MCHC 30.9 (*)   -- RDW 16.1 (*)   -- Gran% 75.3 (*)   -- Lymph% 12.3 (*)     Medications Given  -- clindamycin injection 600 mg (600 mg Intramuscular Given 5/20/17 1516)   -- fluconazole tablet 150 mg (150 mg Oral Given 5/20/17 1516)   -- acetaminophen-codeine 300-30mg per tablet 1 tablet (1 tablet Oral Given 5/20/17 1525)     Diagnosis  -- The primary encounter diagnosis was Yeast infection of the skin.   -- A diagnosis of Cellulitis, unspecified cellulitis site was also pertinent to this visit.    Disposition and Plan  -- Disposition: home  -- Condition: stable  -- Follow-up: Patient to follow up with Yunior Pagan MD in 1-2 days.  -- I advised the patient that we have found no life threatening condition today  -- At this time, I believe the patient is clinically stable for discharge.   -- The patient acknowledges that close follow up with a MD is  required   -- Patient agrees to comply with all instruction and direction given in the ER    This note is dictated on Dragon Natural Speaking word recognition program.  There are word recognition mistakes that are occasionally missed on review.           Colin Lowe MD  05/20/17 5932

## 2017-05-20 NOTE — ED AVS SNAPSHOT
[facility]  [address] [facility] [address]               Mattie Belle   2017  1:53 PM   ED    Description:  Female : 1929   Department:  [facility]           Your Care was Coordinated By:     Provider Role From To    Colin Lowe MD Attending Provider [phone] --      Reason for Visit     Cellulitis           Diagnoses this Visit        Comments    Yeast infection of the skin    -  Primary     Cellulitis, unspecified cellulitis site           ED Disposition     ED Disposition Condition Comment    Discharge             To Do List           Follow-up Information     Follow up with Yunior Pagan MD. Schedule an appointment as soon as possible for a visit in 2 days.    Specialty:  Internal Medicine    Contact information:    [address] [place] [id number]  [phone]         These Medications        Disp Refills Start End    fluconazole (DIFLUCAN) 100 MG tablet 5 tablet 0 2017    Take 1 tablet (100 mg total) by mouth once daily. - Oral    Pharmacy: [address] Ph #: [phone]       clindamycin (CLEOCIN) 300 MG capsule 28 capsule 0 2017    Take 1 capsule (300 mg total) by mouth 4 (four) times daily. - Oral    Pharmacy: [address] Ph #: [phone]       nystatin (MYCOSTATIN) cream 1 Tube 0 2017     Apply topically 2 (two) times daily. - Topical (Top)    Pharmacy: [address] Ph #: [phone]       tramadol (ULTRAM) 50 mg tablet 20 tablet 0 2017    Take 1 tablet (50 mg total) by mouth every 6 (six) hours as needed for Pain. - Oral    Pharmacy: [address] Ph #: [phone]         [facility]     [facility] Care [facility] -  Assistance  Unless  otherwise directed by your provider, please contact [facility], our nurse care line that is available for 24/7 assistance.     Registered nurses in the [facility] provide: appointment scheduling, clinical advisement, health education, and other advisory services.  Call: [phone] (toll free)               Medications           Message regarding Medications     Verify the changes and/or additions to your medication regime listed below are the same as discussed with your clinician today.  If any of these changes or additions are incorrect, please notify your healthcare provider.        START taking these NEW medications        Refills    fluconazole (DIFLUCAN) 100 MG tablet 0    Sig: Take 1 tablet (100 mg total) by mouth once daily.    Class: Normal    Route: Oral    clindamycin (CLEOCIN) 300 MG capsule 0    Sig: Take 1 capsule (300 mg total) by mouth 4 (four) times daily.    Class: Normal    Route: Oral    nystatin (MYCOSTATIN) cream 0    Sig: Apply topically 2 (two) times daily.    Class: Normal    Route: Topical (Top)    tramadol (ULTRAM) 50 mg tablet 0    Sig: Take 1 tablet (50 mg total) by mouth every 6 (six) hours as needed for Pain.    Class: Normal    Route: Oral      These medications were administered today        Dose Freq    clindamycin injection 600 mg 600 mg ED 1 Time    Sig: Inject 4 mLs (600 mg total) into the muscle ED 1 Time.    Class: Normal    Route: Intramuscular    fluconazole tablet 150 mg 150 mg ED 1 Time    Sig: Take 1 tablet (150 mg total) by mouth ED 1 Time.    Class: Normal    Route: Oral    acetaminophen-codeine 300-30mg per tablet 1 tablet 1 tablet ED 1 Time    Sig: Take 1 tablet by mouth ED 1 Time.    Class: Normal    Route: Oral      STOP taking these medications     nystatin (MYCOSTATIN) powder Apply topically 3 (three) times daily.           Verify that the below list of medications is an accurate representation of the medications you are currently taking.  If  none reported, the list may be blank. If incorrect, please contact your healthcare provider. Carry this list with you in case of emergency.           Current Medications     acetaminophen-codeine 300-30mg per tablet 1 tablet Take 1 tablet by mouth ED 1 Time.    atorvastatin (LIPITOR) 80 MG tablet TAKE 1 TABLET DAILY    carvedilol (COREG) 25 MG tablet TAKE 1 TABLET TWICE A DAY    cephALEXin (KEFLEX) 500 MG capsule Take 1 capsule (500 mg total) by mouth 3 (three) times daily.    cephALEXin (KEFLEX) 500 MG capsule Take 1 capsule (500 mg total) by mouth every 8 (eight) hours.    cholecalciferol, vitamin D3, (VITAMIN D3) 5,000 unit Tab Take 1 capsule by mouth once daily.      clindamycin (CLEOCIN) 300 MG capsule Take 1 capsule (300 mg total) by mouth 4 (four) times daily.    clopidogrel (PLAVIX) 75 mg tablet TAKE 1 TABLET DAILY    ELIQUIS 5 mg Tab TAKE 1 TABLET TWICE A DAY    ferrous sulfate 325 mg (65 mg iron) Tab tablet Take 1 tablet (325 mg total) by mouth 3 (three) times daily.    fluconazole (DIFLUCAN) 100 MG tablet Take 1 tablet (100 mg total) by mouth once daily.    nitroGLYCERIN 0.4 MG/DOSE TL SPRY (NITROLINGUAL) 400 mcg/spray spray Place 1 spray under the tongue every 5 (five) minutes as needed for Chest pain (max 3 doses). Max 3 doses    nystatin (MYCOSTATIN) cream Apply topically 2 (two) times daily.    pantoprazole (PROTONIX) 40 MG tablet TAKE 1 TABLET DAILY    ranitidine (ZANTAC) 300 MG capsule Take 300 mg by mouth Daily.     ranitidine (ZANTAC) 300 MG capsule TAKE 1 CAPSULE EVERY EVENING    spironolactone (ALDACTONE) 25 MG tablet AT THE START OF THERAPY TAKE ONE-HALF TABLET (12.5MG) DAILY.  LAB WORK IN 10 TO 14 DAYS WITH POSSIBLE DOSE INCREASE    tramadol (ULTRAM) 50 mg tablet Take 1 tablet (50 mg total) by mouth every 6 (six) hours as needed for Pain.    VIT C/VIT E/LUTEIN/MIN/OMEGA-3 (OCUVITE ORAL) Take by mouth.           Clinical Reference Information           Your Vitals Were     BP Pulse Temp Resp  Weight Last Period    128/58 (BP Location: Left arm, Patient Position: Sitting) 70 97 °F (36.1 °C) (Tympanic) 16 77.1 kg (170 lb) (LMP Unknown)    SpO2 BMI             95% 30.11 kg/m2         Allergies as of 5/20/2017     No Known Allergies      Immunizations Administered on Date of Encounter - 5/20/2017     None      ED Micro, Lab, POCT     Start Ordered       Status Ordering Provider    05/20/17 1407 05/20/17 1406  Comprehensive metabolic panel  STAT      Final result     05/20/17 1407 05/20/17 1406  CBC auto differential  STAT      Final result       ED Imaging Orders     None        Discharge Instructions         Candida Skin Infection (Adult)  Candida is type of yeast. It grows naturally on the skin and in the mouth. If it grows out of control, it can cause an infection. Candida can cause infections in the genital area, skin folds, in the mouth, and under the breasts. Anyone can get this infection. It is more common in a person with a weak immune system, such as from diabetes, HIV, or cancer. Its also more common in someone who has been on antibiotic therapy. And its more common people who are overweight or who have incontinence. Wearing tight-fitting clothing and taking part in activities with lots of skin-to-skin contact can also put you at risk.  Candida causes the skin to become bright red and inflamed. The border of the infected part of the skin is often raised. The infection causes pain and itching. Sometimes the skin peels and bleeds. In the mouth, candida is called thrush, and may cause white thickened areas.  A Candida rash is most often treated with an antifungal cream or ointment. The rash will clear a few days after starting the medicine. Infections that dont go away may need a prescription medicine. In rare cases, a bacterial infection can also occur.  Home care  Your healthcare provider will recommend an antifungal cream or ointment for the rash. He or she may also prescribe a medicine for the  itch. Follow all instructions for using these medicines. Dont use cornstarch powder. Cornstarch can cause the Candida infection to get worse.  General care:  · Keep your skin clean by washing the area twice a day.  · Use the cream as directed until your rash is gone. Once the skin has healed, keep it dry to prevent another infection.   · If you are overweight, talk with your healthcare provider about a plan to lose excess weight.  · Avoid clothes that fit tightly.  Follow-up care  Follow up with your healthcare provider, or as advised. Your rash will clear in 7 to 14 days. Call your healthcare provider if the rash is not gone after 14 days.  When to seek medical advice  Call your healthcare provider right away if any of these occur:  · Pain or redness that gets worse or spreads  · Fluid coming from the skin  · Yellow crusts on the skin  · Fever of 100.4°F (38°C) or higher, or as directed by your healthcare provider  Date Last Reviewed: 9/1/2016  © 8677-3054 [employer]. [address] [place] [address]. All rights reserved. This information is not intended as a substitute for professional medical care. Always follow your healthcare professional's instructions.          Your Scheduled Appointments     May 23, 2017  9:40 AM CDT   Telephonic Pacemaker Check with TELEPHONE CHECK, PACEMAKER   [facility] ([facility] )    [address]  [facility] [address]   [phone]            Nov 15, 2017  8:30 AM CST   Nurse Visit with NURSE, [facility]   [facility] ([facility])    [address] [facility] [address]   [phone]            Nov 20, 2017 10:30 AM CST   Established Patient Visit with Yunior Pagan MD   [facility] ([facility])    [address] [facility] [address]   [phone]               [facility] complies with applicable Federal civil rights laws and does not discriminate on  the basis of race, color, national origin, age, disability, or sex.        Language Assistance Services     ATTENTION: Language assistance services are available, free of charge. Please call [phone].      ATENCIÓN: Si leah vinson, tiene a benson disposición servicios gratuitos de asistencia lingüística. Llame al [phone].     CHÚ Ý: N?u b?n nói Ti?ng Vi?t, có các d?ch v? h? tr? ngôn ng? mi?n phí dành cho b?n. G?i s? [phone].

## 2017-05-22 ENCOUNTER — TELEPHONE (OUTPATIENT)
Dept: INTERNAL MEDICINE | Facility: CLINIC | Age: 82
End: 2017-05-22

## 2017-05-22 DIAGNOSIS — R11.0 NAUSEA: Primary | ICD-10-CM

## 2017-05-22 RX ORDER — ONDANSETRON 4 MG/1
4 TABLET, FILM COATED ORAL EVERY 8 HOURS PRN
Qty: 14 TABLET | Refills: 0 | Status: SHIPPED | OUTPATIENT
Start: 2017-05-22 | End: 2017-05-29

## 2017-05-22 NOTE — TELEPHONE ENCOUNTER
----- Message from Amairani Hernandez sent at 2017  9:29 AM CDT -----  Contact: Radha / Daughter  Mattie Belle  MRN: [id number]  : 1929  PCP: Yunior Pagan  Home Phone      [phone]  Work Phone      Not on file.  [place]          [phone]      MESSAGE:   Pt is requesting to have something called in for nausea. She is on antibiotics that is causing her to be nauseous.     Pharmacy: [facility] in [place]    Phone: [phone]

## 2017-05-23 ENCOUNTER — CLINICAL SUPPORT (OUTPATIENT)
Dept: ELECTROPHYSIOLOGY | Facility: CLINIC | Age: 82
End: 2017-05-23
Payer: MEDICARE

## 2017-05-23 DIAGNOSIS — Z95.0 CARDIAC PACEMAKER IN SITU: ICD-10-CM

## 2017-05-23 DIAGNOSIS — I44.2 COMPLETE AV BLOCK: ICD-10-CM

## 2017-05-23 DIAGNOSIS — I48.21 PERMANENT ATRIAL FIBRILLATION: ICD-10-CM

## 2017-05-23 PROCEDURE — 93293 PM PHONE R-STRIP DEVICE EVAL: CPT | Mod: PBBFAC | Performed by: INTERNAL MEDICINE

## 2017-05-26 ENCOUNTER — OFFICE VISIT (OUTPATIENT)
Dept: INTERNAL MEDICINE | Facility: CLINIC | Age: 82
End: 2017-05-26
Payer: MEDICARE

## 2017-05-26 VITALS
HEART RATE: 68 BPM | WEIGHT: 160 LBS | SYSTOLIC BLOOD PRESSURE: 128 MMHG | HEIGHT: 63 IN | RESPIRATION RATE: 16 BRPM | DIASTOLIC BLOOD PRESSURE: 66 MMHG | BODY MASS INDEX: 28.35 KG/M2 | OXYGEN SATURATION: 98 %

## 2017-05-26 DIAGNOSIS — L03.113 CELLULITIS OF ARM, RIGHT: ICD-10-CM

## 2017-05-26 DIAGNOSIS — B37.2 YEAST DERMATITIS: ICD-10-CM

## 2017-05-26 DIAGNOSIS — Z09 FOLLOW UP: Primary | ICD-10-CM

## 2017-05-26 PROCEDURE — 1157F ADVNC CARE PLAN IN RCRD: CPT | Mod: 8P | Performed by: NURSE PRACTITIONER

## 2017-05-26 PROCEDURE — 99214 OFFICE O/P EST MOD 30 MIN: CPT | Mod: PBBFAC | Performed by: NURSE PRACTITIONER

## 2017-05-26 PROCEDURE — 1159F MED LIST DOCD IN RCRD: CPT | Performed by: NURSE PRACTITIONER

## 2017-05-26 PROCEDURE — 99999 PR PBB SHADOW E&M-EST. PATIENT-LVL IV: CPT | Mod: PBBFAC,,, | Performed by: NURSE PRACTITIONER

## 2017-05-26 PROCEDURE — 99213 OFFICE O/P EST LOW 20 MIN: CPT | Mod: S$PBB | Performed by: NURSE PRACTITIONER

## 2017-05-26 RX ORDER — NYSTATIN 100000 [USP'U]/G
POWDER TOPICAL
COMMUNITY
Start: 2017-05-22 | End: 2017-09-25 | Stop reason: SDUPTHER

## 2017-05-26 NOTE — PROGRESS NOTES
Subjective:       Patient ID: Mattie Belle is a 88 y.o. female.    Chief Complaint: Follow-up (ER follow-up)    HPI: pt known to me presents for f/u. Reports taking her last diflucan today. Using topical nystatin and doing much better. Had to go to ER for pain, given shot, but overall, but better.     Review of Systems   Constitutional: Negative for chills, diaphoresis, fatigue and fever.   Respiratory: Negative for cough, shortness of breath and wheezing.    Cardiovascular: Negative for chest pain.   Gastrointestinal: Negative for abdominal distention, abdominal pain, constipation, diarrhea, nausea and vomiting.   Skin: Negative for color change.   Neurological: Negative for headaches.       Objective:      Physical Exam   Constitutional: She is oriented to person, place, and time. She appears well-developed and well-nourished.   HENT:   Head: Normocephalic and atraumatic.   Cardiovascular: Normal rate, regular rhythm and normal heart sounds.    Pulmonary/Chest: Effort normal and breath sounds normal.   Abdominal: Soft. Bowel sounds are normal. There is no tenderness.   Musculoskeletal: She exhibits edema (plus 2 pitting edema, daughter reports dependenet from being in w/c, no sob. ).   Neurological: She is alert and oriented to person, place, and time.   Skin: Skin is warm and dry.        Psychiatric: She has a normal mood and affect. Her behavior is normal. Judgment and thought content normal.   Nursing note and vitals reviewed.      Assessment:       1. Follow up    2. Yeast dermatitis    3. Cellulitis of arm, right        Plan:     1. Follow up      2. Yeast dermatitis  Ok to just do nystatin 2 x daily and then wean down. Good barrier est. They are educated on prevention.     3. Cellulitis of arm, right  Resolved, off a/b.

## 2017-06-14 RX ORDER — NYSTATIN 100000 U/G
CREAM TOPICAL 2 TIMES DAILY
Qty: 1 TUBE | Refills: 0 | Status: SHIPPED | OUTPATIENT
Start: 2017-06-14

## 2017-06-14 NOTE — TELEPHONE ENCOUNTER
----- Message from Ammy Soto sent at 2017 11:51 AM CDT -----  Contact: TOÑITO DAUGHTER  Mattie Belle  MRN: [id number]  : 1929  PCP: Yunior Pagan  Home Phone      [phone]  Work Phone      Not on file.  [employer]          [phone]      MESSAGE: [facility]------[phone]

## 2017-06-27 ENCOUNTER — CLINICAL SUPPORT (OUTPATIENT)
Dept: ELECTROPHYSIOLOGY | Facility: CLINIC | Age: 82
End: 2017-06-27
Payer: MEDICARE

## 2017-06-27 DIAGNOSIS — I44.2 COMPLETE AV BLOCK: ICD-10-CM

## 2017-06-27 DIAGNOSIS — I48.21 PERMANENT ATRIAL FIBRILLATION: ICD-10-CM

## 2017-06-27 DIAGNOSIS — Z95.0 CARDIAC PACEMAKER IN SITU: ICD-10-CM

## 2017-06-27 PROCEDURE — 93293 PM PHONE R-STRIP DEVICE EVAL: CPT | Mod: PBBFAC | Performed by: INTERNAL MEDICINE

## 2017-07-27 ENCOUNTER — CLINICAL SUPPORT (OUTPATIENT)
Dept: ELECTROPHYSIOLOGY | Facility: CLINIC | Age: 82
End: 2017-07-27
Payer: MEDICARE

## 2017-07-27 DIAGNOSIS — I44.2 COMPLETE AV BLOCK: ICD-10-CM

## 2017-07-27 DIAGNOSIS — Z95.0 CARDIAC PACEMAKER IN SITU: ICD-10-CM

## 2017-07-27 DIAGNOSIS — I48.21 PERMANENT ATRIAL FIBRILLATION: ICD-10-CM

## 2017-07-27 PROCEDURE — 93293 PM PHONE R-STRIP DEVICE EVAL: CPT | Mod: PBBFAC | Performed by: INTERNAL MEDICINE

## 2017-08-17 RX ORDER — CLOPIDOGREL BISULFATE 75 MG/1
75 TABLET ORAL DAILY
Qty: 90 TABLET | Refills: 0 | Status: SHIPPED | OUTPATIENT
Start: 2017-08-17 | End: 2017-11-15 | Stop reason: SDUPTHER

## 2017-08-17 RX ORDER — ATORVASTATIN CALCIUM 80 MG/1
80 TABLET, FILM COATED ORAL DAILY
Qty: 90 TABLET | Refills: 0 | Status: SHIPPED | OUTPATIENT
Start: 2017-08-17 | End: 2017-11-15 | Stop reason: SDUPTHER

## 2017-08-29 ENCOUNTER — CLINICAL SUPPORT (OUTPATIENT)
Dept: ELECTROPHYSIOLOGY | Facility: CLINIC | Age: 82
End: 2017-08-29
Payer: MEDICARE

## 2017-08-29 DIAGNOSIS — I44.2 COMPLETE AV BLOCK: ICD-10-CM

## 2017-08-29 DIAGNOSIS — I48.21 PERMANENT ATRIAL FIBRILLATION: ICD-10-CM

## 2017-08-29 DIAGNOSIS — Z95.0 CARDIAC PACEMAKER IN SITU: ICD-10-CM

## 2017-08-29 PROCEDURE — 93293 PM PHONE R-STRIP DEVICE EVAL: CPT | Mod: PBBFAC | Performed by: INTERNAL MEDICINE

## 2017-09-06 RX ORDER — APIXABAN 5 MG/1
TABLET, FILM COATED ORAL
Qty: 180 TABLET | Refills: 1 | Status: SHIPPED | OUTPATIENT
Start: 2017-09-06 | End: 2018-03-04 | Stop reason: SDUPTHER

## 2017-09-14 RX ORDER — PANTOPRAZOLE SODIUM 40 MG/1
TABLET, DELAYED RELEASE ORAL
Qty: 90 TABLET | Refills: 3 | Status: SHIPPED | OUTPATIENT
Start: 2017-09-14 | End: 2018-09-10 | Stop reason: SDUPTHER

## 2017-09-24 DIAGNOSIS — R79.89 ELEVATED BRAIN NATRIURETIC PEPTIDE (BNP) LEVEL: ICD-10-CM

## 2017-09-24 RX ORDER — SPIRONOLACTONE 25 MG/1
TABLET ORAL
Qty: 90 TABLET | Refills: 2 | OUTPATIENT
Start: 2017-09-24

## 2017-09-25 ENCOUNTER — TELEPHONE (OUTPATIENT)
Dept: INTERNAL MEDICINE | Facility: CLINIC | Age: 82
End: 2017-09-25

## 2017-09-25 NOTE — TELEPHONE ENCOUNTER
----- Message from Viola Villagran sent at 2017  8:54 AM CDT -----  Contact: Pt's daughter Radha Belle  MRN: [id number]  : 1929  PCP: Yunior Pagan  Home Phone      [phone]  Work Phone      Not on file.  [employer]          [phone]      MESSAGE:  Needs refill on Nystatin powder. Wants to know if we can call in a large quantity of it because they prefer the larger quantity. Please advise.    PHONE:  [phone]

## 2017-09-26 RX ORDER — NYSTATIN 100000 [USP'U]/G
POWDER TOPICAL
Qty: 60 G | Refills: 5 | Status: SHIPPED | OUTPATIENT
Start: 2017-09-26 | End: 2018-07-02 | Stop reason: SDUPTHER

## 2017-09-27 DIAGNOSIS — R79.89 ELEVATED BRAIN NATRIURETIC PEPTIDE (BNP) LEVEL: ICD-10-CM

## 2017-09-27 RX ORDER — SPIRONOLACTONE 25 MG/1
25 TABLET ORAL DAILY
Qty: 90 TABLET | Refills: 2 | Status: SHIPPED | OUTPATIENT
Start: 2017-09-27 | End: 2018-06-24 | Stop reason: SDUPTHER

## 2017-09-28 ENCOUNTER — CLINICAL SUPPORT (OUTPATIENT)
Dept: ELECTROPHYSIOLOGY | Facility: CLINIC | Age: 82
End: 2017-09-28
Payer: MEDICARE

## 2017-09-28 DIAGNOSIS — I48.91 ATRIAL FIBRILLATION: ICD-10-CM

## 2017-09-28 DIAGNOSIS — I44.2 COMPLETE AV BLOCK: ICD-10-CM

## 2017-09-28 DIAGNOSIS — Z95.0 CARDIAC PACEMAKER IN SITU: Primary | ICD-10-CM

## 2017-09-28 PROCEDURE — 93293 PM PHONE R-STRIP DEVICE EVAL: CPT | Mod: PBBFAC | Performed by: INTERNAL MEDICINE

## 2017-10-11 ENCOUNTER — TELEPHONE (OUTPATIENT)
Dept: INTERNAL MEDICINE | Facility: CLINIC | Age: 82
End: 2017-10-11

## 2017-10-11 RX ORDER — FLUCONAZOLE 50 MG/1
50 TABLET ORAL DAILY
Qty: 1 TABLET | Refills: 1 | Status: SHIPPED | OUTPATIENT
Start: 2017-10-11 | End: 2017-11-10

## 2017-10-11 NOTE — TELEPHONE ENCOUNTER
----- Message from Ammy Soto sent at 10/11/2017  8:52 AM CDT -----  Contact: tyler daughter  Mattie Belle  MRN: [id number]  : 1929  PCP: Yunior Pagan  Home Phone      [phone]  Work Phone      Not on file.  [employer]          [phone]      MESSAGE: need meds for yeast infection--need oral meds only---[phone]   [facility] [place]

## 2017-10-25 ENCOUNTER — CLINICAL SUPPORT (OUTPATIENT)
Dept: ELECTROPHYSIOLOGY | Facility: CLINIC | Age: 82
End: 2017-10-25
Payer: MEDICARE

## 2017-10-25 DIAGNOSIS — I44.2 ATRIOVENTRICULAR BLOCK, COMPLETE: ICD-10-CM

## 2017-10-25 DIAGNOSIS — I48.91 ATRIAL FIBRILLATION: ICD-10-CM

## 2017-10-25 DIAGNOSIS — Z95.0 CARDIAC PACEMAKER IN SITU: ICD-10-CM

## 2017-10-25 PROCEDURE — 93279 PRGRMG DEV EVAL PM/LDLS PM: CPT | Mod: PBBFAC | Performed by: INTERNAL MEDICINE

## 2017-11-14 RX ORDER — CARVEDILOL 25 MG/1
TABLET ORAL
Qty: 180 TABLET | Refills: 2 | Status: SHIPPED | OUTPATIENT
Start: 2017-11-14 | End: 2018-08-10 | Stop reason: SDUPTHER

## 2017-11-15 RX ORDER — CLOPIDOGREL BISULFATE 75 MG/1
TABLET ORAL
Qty: 90 TABLET | Refills: 0 | Status: SHIPPED | OUTPATIENT
Start: 2017-11-15 | End: 2018-02-13 | Stop reason: SDUPTHER

## 2017-11-15 RX ORDER — ATORVASTATIN CALCIUM 80 MG/1
TABLET, FILM COATED ORAL
Qty: 90 TABLET | Refills: 0 | Status: SHIPPED | OUTPATIENT
Start: 2017-11-15 | End: 2018-02-13 | Stop reason: SDUPTHER

## 2017-12-15 ENCOUNTER — CLINICAL SUPPORT (OUTPATIENT)
Dept: ELECTROPHYSIOLOGY | Facility: CLINIC | Age: 82
End: 2017-12-15
Payer: MEDICARE

## 2017-12-15 DIAGNOSIS — I48.91 ATRIAL FIBRILLATION: ICD-10-CM

## 2017-12-15 DIAGNOSIS — Z95.0 CARDIAC PACEMAKER IN SITU: ICD-10-CM

## 2017-12-15 DIAGNOSIS — I44.2 ATRIOVENTRICULAR BLOCK, COMPLETE: ICD-10-CM

## 2017-12-15 PROCEDURE — 93279 PRGRMG DEV EVAL PM/LDLS PM: CPT | Mod: PBBFAC | Performed by: INTERNAL MEDICINE

## 2017-12-18 ENCOUNTER — OFFICE VISIT (OUTPATIENT)
Dept: INTERNAL MEDICINE | Facility: CLINIC | Age: 82
End: 2017-12-18
Payer: MEDICARE

## 2017-12-18 ENCOUNTER — LAB VISIT (OUTPATIENT)
Dept: LAB | Facility: HOSPITAL | Age: 82
End: 2017-12-18
Attending: INTERNAL MEDICINE
Payer: MEDICARE

## 2017-12-18 VITALS
HEIGHT: 63 IN | DIASTOLIC BLOOD PRESSURE: 42 MMHG | HEART RATE: 71 BPM | WEIGHT: 160.06 LBS | BODY MASS INDEX: 28.36 KG/M2 | SYSTOLIC BLOOD PRESSURE: 90 MMHG | RESPIRATION RATE: 16 BRPM | OXYGEN SATURATION: 96 %

## 2017-12-18 DIAGNOSIS — I10 ESSENTIAL HYPERTENSION: ICD-10-CM

## 2017-12-18 DIAGNOSIS — I48.91 ATRIAL FIBRILLATION, UNSPECIFIED TYPE: ICD-10-CM

## 2017-12-18 DIAGNOSIS — I10 ESSENTIAL HYPERTENSION: Primary | ICD-10-CM

## 2017-12-18 DIAGNOSIS — E78.5 HYPERLIPIDEMIA, UNSPECIFIED HYPERLIPIDEMIA TYPE: ICD-10-CM

## 2017-12-18 DIAGNOSIS — D64.9 ANEMIA, UNSPECIFIED TYPE: ICD-10-CM

## 2017-12-18 LAB
ALBUMIN SERPL BCP-MCNC: 3.6 G/DL
ALP SERPL-CCNC: 69 U/L
ALT SERPL W/O P-5'-P-CCNC: 11 U/L
ANION GAP SERPL CALC-SCNC: 8 MMOL/L
AST SERPL-CCNC: 18 U/L
BASOPHILS # BLD AUTO: 0.01 K/UL
BASOPHILS NFR BLD: 0.2 %
BILIRUB SERPL-MCNC: 0.9 MG/DL
BUN SERPL-MCNC: 20 MG/DL
CALCIUM SERPL-MCNC: 9.8 MG/DL
CHLORIDE SERPL-SCNC: 104 MMOL/L
CHOLEST SERPL-MCNC: 89 MG/DL
CHOLEST/HDLC SERPL: 2 {RATIO}
CO2 SERPL-SCNC: 29 MMOL/L
CREAT SERPL-MCNC: 1.1 MG/DL
DIFFERENTIAL METHOD: ABNORMAL
EOSINOPHIL # BLD AUTO: 0.1 K/UL
EOSINOPHIL NFR BLD: 0.8 %
ERYTHROCYTE [DISTWIDTH] IN BLOOD BY AUTOMATED COUNT: 14.7 %
EST. GFR  (AFRICAN AMERICAN): 52 ML/MIN/1.73 M^2
EST. GFR  (NON AFRICAN AMERICAN): 45 ML/MIN/1.73 M^2
GLUCOSE SERPL-MCNC: 103 MG/DL
HCT VFR BLD AUTO: 36.1 %
HDLC SERPL-MCNC: 45 MG/DL
HDLC SERPL: 50.6 %
HGB BLD-MCNC: 11.3 G/DL
LDLC SERPL CALC-MCNC: 33.6 MG/DL
LYMPHOCYTES # BLD AUTO: 1.5 K/UL
LYMPHOCYTES NFR BLD: 24.4 %
MCH RBC QN AUTO: 29.5 PG
MCHC RBC AUTO-ENTMCNC: 31.3 G/DL
MCV RBC AUTO: 94 FL
MONOCYTES # BLD AUTO: 0.5 K/UL
MONOCYTES NFR BLD: 8.5 %
NEUTROPHILS # BLD AUTO: 4 K/UL
NEUTROPHILS NFR BLD: 66.1 %
NONHDLC SERPL-MCNC: 44 MG/DL
PLATELET # BLD AUTO: 206 K/UL
PMV BLD AUTO: 10.7 FL
POTASSIUM SERPL-SCNC: 4.5 MMOL/L
PROT SERPL-MCNC: 6.6 G/DL
RBC # BLD AUTO: 3.83 M/UL
SODIUM SERPL-SCNC: 141 MMOL/L
TRIGL SERPL-MCNC: 52 MG/DL
TSH SERPL DL<=0.005 MIU/L-ACNC: 0.48 UIU/ML
WBC # BLD AUTO: 5.98 K/UL

## 2017-12-18 PROCEDURE — 85025 COMPLETE CBC W/AUTO DIFF WBC: CPT

## 2017-12-18 PROCEDURE — G0008 ADMIN INFLUENZA VIRUS VAC: HCPCS | Mod: PBBFAC

## 2017-12-18 PROCEDURE — 80061 LIPID PANEL: CPT

## 2017-12-18 PROCEDURE — 99213 OFFICE O/P EST LOW 20 MIN: CPT | Mod: PBBFAC,25 | Performed by: INTERNAL MEDICINE

## 2017-12-18 PROCEDURE — 99999 PR STA SHADOW: CPT | Mod: PBBFAC,,, | Performed by: INTERNAL MEDICINE

## 2017-12-18 PROCEDURE — 99999 FLU VACCINE - HIGH DOSE (65+) PRESERVATIVE FREE IM: CPT | Mod: PBBFAC,,,

## 2017-12-18 PROCEDURE — 84443 ASSAY THYROID STIM HORMONE: CPT

## 2017-12-18 PROCEDURE — 99999 PR PBB SHADOW E&M-EST. PATIENT-LVL III: CPT | Mod: PBBFAC,,, | Performed by: INTERNAL MEDICINE

## 2017-12-18 PROCEDURE — 36415 COLL VENOUS BLD VENIPUNCTURE: CPT

## 2017-12-18 PROCEDURE — 99214 OFFICE O/P EST MOD 30 MIN: CPT | Mod: S$PBB | Performed by: INTERNAL MEDICINE

## 2017-12-18 PROCEDURE — 80053 COMPREHEN METABOLIC PANEL: CPT

## 2017-12-18 NOTE — PROGRESS NOTES
Subjective:       Patient ID: Mattie Belle is a 88 y.o. female.    Chief Complaint: Hyperlipidemia (follow up with lab review) and Hypertension    Mattie Belle is a 88 y.o. female who presents for ch anemia, CHF ,  Hypertension, and Hyperlipidemia follow up. Labs were reviewed with patient today.  Anemia highlighted with patient and daughter . Not willing to do scopes and work up       Hyperlipidemia   This is a chronic problem. The problem is controlled. Recent lipid tests were reviewed and are low. She has no history of obesity. Pertinent negatives include no chest pain, myalgias or shortness of breath.   Hypertension   This is a chronic problem. The current episode started more than 1 year ago. The problem is unchanged. The problem is controlled. Pertinent negatives include no blurred vision, chest pain, headaches, orthopnea, palpitations, PND or shortness of breath. Past treatments include beta blockers. The current treatment provides moderate improvement. Compliance problems include diet.    Medication Refill   Associated symptoms include a rash and weakness. Pertinent negatives include no abdominal pain, arthralgias, chest pain, chills, congestion, coughing, diaphoresis, fatigue, fever, headaches, myalgias, nausea, numbness, sore throat or vomiting.   Gastroesophageal Reflux   She reports no abdominal pain, no chest pain, no choking, no coughing, no nausea, no sore throat or no wheezing. Pertinent negatives include no fatigue.     Review of Systems   Constitutional: Negative for chills, diaphoresis, fatigue and fever.   HENT: Negative for congestion and sore throat.    Eyes: Negative for blurred vision.   Respiratory: Negative for cough, choking, shortness of breath and wheezing.    Cardiovascular: Negative for chest pain, palpitations, orthopnea and PND.   Gastrointestinal: Negative for abdominal distention, abdominal pain, constipation, diarrhea, nausea and vomiting.   Musculoskeletal: Negative for  arthralgias, back pain and myalgias.   Skin: Positive for rash.   Neurological: Positive for weakness. Negative for numbness and headaches.   Psychiatric/Behavioral: Negative for sleep disturbance.       Objective:      Physical Exam   Constitutional: She is oriented to person, place, and time. She appears well-developed and well-nourished.   HENT:   Head: Normocephalic and atraumatic.   Cardiovascular: Normal rate, regular rhythm and normal heart sounds.    Pulmonary/Chest: Effort normal and breath sounds normal.   Musculoskeletal: She exhibits no edema.   No passive rom to right shoulder. i can barely get any airspace in between her right arm and her lateral side/breast.    Neurological: She is alert and oriented to person, place, and time.   Skin: Skin is warm and dry.        mild erythema left    Psychiatric: She has a normal mood and affect. Her behavior is normal. Judgment and thought content normal.   Nursing note and vitals reviewed.      Assessment:       1. Essential hypertension    2. Hyperlipidemia, unspecified hyperlipidemia type    3. Atrial fibrillation, unspecified type        Plan:   Mattie was seen today for hyperlipidemia and hypertension.    Diagnoses and all orders for this visit:    Essential hypertension  -     CBC auto differential; Future  -     Comprehensive metabolic panel; Future    Well controlled.  Continue same medication and dose.  1. Keep weight close to ideal body weight.   2.   Avoid high salt foods (olives, pickles, smoked meats, salted potato chips, etc.).   Do not add salt to your food at the table.   Use only small amounts of salt when cooking.   3. Begin an exercise program. Discuss with your doctor what type of exercise program would be best for you. It doesn't have to be difficult. Even brisk walking for 20 minutes three times a week is a good form of exercise.   4. Avoid medicines which contain heart stimulants. This includes many cold and sinus decongestant pills and sprays  as well as diet pills. Check the warnings about hypertension on the label. Stimulants such as amphetamine or cocaine could be lethal for someone with hypertension. Never take these.    Hyperlipidemia, unspecified hyperlipidemia type  -     Lipid panel; Future  -     TSH; Future  -     Brain natriuretic peptide; Future  The current medical regimen is effective;  continue present plan and medications.  Atrial fibrillation, unspecified type  -     TSH; Future  -     Brain natriuretic peptide; Future  The current medical regimen is effective;  continue present plan and medications.

## 2018-01-19 ENCOUNTER — HOSPITAL ENCOUNTER (EMERGENCY)
Facility: HOSPITAL | Age: 83
Discharge: HOME | End: 2018-01-19
Attending: SURGERY
Payer: MEDICARE

## 2018-01-19 VITALS
SYSTOLIC BLOOD PRESSURE: 160 MMHG | RESPIRATION RATE: 17 BRPM | TEMPERATURE: 98 F | OXYGEN SATURATION: 95 % | DIASTOLIC BLOOD PRESSURE: 80 MMHG | WEIGHT: 160 LBS | BODY MASS INDEX: 28.34 KG/M2 | HEART RATE: 75 BPM

## 2018-01-19 DIAGNOSIS — R05.9 COUGH: ICD-10-CM

## 2018-01-19 DIAGNOSIS — J00 ACUTE NASOPHARYNGITIS: Primary | ICD-10-CM

## 2018-01-19 DIAGNOSIS — J06.9 UPPER RESPIRATORY INFECTION: ICD-10-CM

## 2018-01-19 LAB
ALBUMIN SERPL BCP-MCNC: 3.9 G/DL
ALP SERPL-CCNC: 72 U/L
ALT SERPL W/O P-5'-P-CCNC: 12 U/L
ANION GAP SERPL CALC-SCNC: 7 MMOL/L
AST SERPL-CCNC: 20 U/L
BASOPHILS # BLD AUTO: 0.01 K/UL
BASOPHILS NFR BLD: 0.1 %
BILIRUB SERPL-MCNC: 1.1 MG/DL
BUN SERPL-MCNC: 16 MG/DL
CALCIUM SERPL-MCNC: 9.7 MG/DL
CHLORIDE SERPL-SCNC: 102 MMOL/L
CO2 SERPL-SCNC: 29 MMOL/L
CREAT SERPL-MCNC: 1 MG/DL
DIFFERENTIAL METHOD: ABNORMAL
EOSINOPHIL # BLD AUTO: 0.1 K/UL
EOSINOPHIL NFR BLD: 1.5 %
ERYTHROCYTE [DISTWIDTH] IN BLOOD BY AUTOMATED COUNT: 14.3 %
EST. GFR  (AFRICAN AMERICAN): 58 ML/MIN/1.73 M^2
EST. GFR  (NON AFRICAN AMERICAN): 50 ML/MIN/1.73 M^2
FLUAV AG SPEC QL IA: NEGATIVE
FLUBV AG SPEC QL IA: NEGATIVE
GLUCOSE SERPL-MCNC: 123 MG/DL
HCT VFR BLD AUTO: 37.1 %
HGB BLD-MCNC: 12.1 G/DL
LYMPHOCYTES # BLD AUTO: 1.2 K/UL
LYMPHOCYTES NFR BLD: 17.7 %
MCH RBC QN AUTO: 30.6 PG
MCHC RBC AUTO-ENTMCNC: 32.6 G/DL
MCV RBC AUTO: 94 FL
MONOCYTES # BLD AUTO: 0.5 K/UL
MONOCYTES NFR BLD: 6.9 %
NEUTROPHILS # BLD AUTO: 5.1 K/UL
NEUTROPHILS NFR BLD: 73.8 %
PLATELET # BLD AUTO: 200 K/UL
PMV BLD AUTO: 10.4 FL
POTASSIUM SERPL-SCNC: 4.6 MMOL/L
PROT SERPL-MCNC: 7 G/DL
RBC # BLD AUTO: 3.96 M/UL
SODIUM SERPL-SCNC: 138 MMOL/L
SPECIMEN SOURCE: NORMAL
WBC # BLD AUTO: 6.85 K/UL

## 2018-01-19 PROCEDURE — 25000003 PHARM REV CODE 250: Performed by: SURGERY

## 2018-01-19 PROCEDURE — 94640 AIRWAY INHALATION TREATMENT: CPT

## 2018-01-19 PROCEDURE — 80053 COMPREHEN METABOLIC PANEL: CPT

## 2018-01-19 PROCEDURE — 36415 COLL VENOUS BLD VENIPUNCTURE: CPT

## 2018-01-19 PROCEDURE — 87400 INFLUENZA A/B EACH AG IA: CPT | Mod: 59

## 2018-01-19 PROCEDURE — 99284 EMERGENCY DEPT VISIT MOD MDM: CPT | Mod: 25

## 2018-01-19 PROCEDURE — 85025 COMPLETE CBC W/AUTO DIFF WBC: CPT

## 2018-01-19 PROCEDURE — 25000242 PHARM REV CODE 250 ALT 637 W/ HCPCS: Performed by: SURGERY

## 2018-01-19 PROCEDURE — 96372 THER/PROPH/DIAG INJ SC/IM: CPT

## 2018-01-19 PROCEDURE — 63600175 PHARM REV CODE 636 W HCPCS: Performed by: SURGERY

## 2018-01-19 RX ORDER — LEVOFLOXACIN 500 MG/1
500 TABLET, FILM COATED ORAL DAILY
Qty: 7 TABLET | Refills: 0 | Status: SHIPPED | OUTPATIENT
Start: 2018-01-19 | End: 2018-01-26

## 2018-01-19 RX ORDER — LIDOCAINE HYDROCHLORIDE 10 MG/ML
2.1 INJECTION INFILTRATION; PERINEURAL ONCE
Status: COMPLETED | OUTPATIENT
Start: 2018-01-19 | End: 2018-01-19

## 2018-01-19 RX ORDER — PROMETHAZINE HYDROCHLORIDE AND DEXTROMETHORPHAN HYDROBROMIDE 6.25; 15 MG/5ML; MG/5ML
5 SYRUP ORAL EVERY 6 HOURS PRN
Qty: 118 ML | Refills: 0 | Status: SHIPPED | OUTPATIENT
Start: 2018-01-19 | End: 2018-01-29

## 2018-01-19 RX ORDER — CEFTRIAXONE 1 G/1
1 INJECTION, POWDER, FOR SOLUTION INTRAMUSCULAR; INTRAVENOUS ONCE
Status: COMPLETED | OUTPATIENT
Start: 2018-01-19 | End: 2018-01-19

## 2018-01-19 RX ORDER — IPRATROPIUM BROMIDE AND ALBUTEROL SULFATE 2.5; .5 MG/3ML; MG/3ML
3 SOLUTION RESPIRATORY (INHALATION)
Status: COMPLETED | OUTPATIENT
Start: 2018-01-19 | End: 2018-01-19

## 2018-01-19 RX ORDER — CEFTRIAXONE 1 G/1
1 INJECTION, POWDER, FOR SOLUTION INTRAMUSCULAR; INTRAVENOUS ONCE
Status: DISCONTINUED | OUTPATIENT
Start: 2018-01-19 | End: 2018-01-19

## 2018-01-19 RX ORDER — METHYLPREDNISOLONE 4 MG/1
TABLET ORAL
Qty: 1 PACKAGE | Refills: 0 | Status: SHIPPED | OUTPATIENT
Start: 2018-01-19 | End: 2018-07-02 | Stop reason: ALTCHOICE

## 2018-01-19 RX ADMIN — IPRATROPIUM BROMIDE AND ALBUTEROL SULFATE 3 ML: .5; 3 SOLUTION RESPIRATORY (INHALATION) at 06:01

## 2018-01-19 RX ADMIN — CEFTRIAXONE SODIUM 1 G: 1 INJECTION, POWDER, FOR SOLUTION INTRAMUSCULAR; INTRAVENOUS at 06:01

## 2018-01-19 RX ADMIN — LIDOCAINE HYDROCHLORIDE 2.1 ML: 10 INJECTION, SOLUTION INFILTRATION; PERINEURAL at 06:01

## 2018-01-20 NOTE — ED PROVIDER NOTES
[facility]                                         January 19, 2018                   Chief Complaint  88 y.o. female with URI (uri symptoms x 2-3 days)    History of Present Illness  Mattei Belle presents to the emergency room with nasal congestion noted today   Patient on exam has clear nasal drainage with nasal mucosa erythema noted now   Patient has had a dry hacking cough for a week, clear lung sounds bilaterally today  Patient has no nausea vomiting or diarrhea, she is negative for influenza in the ER     The history is provided by the patient     Past Medical History   -- Atrial fibrillation     -- CHF (congestive heart failure)     -- Hyperlipidemia     -- Hypertension     -- Stroke        Past Surgical History   -- CHOLECYSTECTOMY       -- HYSTERECTOMY       -- INSERT / REPLACE / REMOVE PACEMAKER          No Known Allergies     Review of Systems and Physical Exam     Review of Systems  -- Constitution - no fever, denies fatigue, no weakness, no chills  -- Eyes - no tearing or redness, no visual disturbance  -- Ear, Nose - sneezing, nasal congestion and clear discharge   -- Mouth,Throat - no sore throat, no toothache, normal voice, normal swallowing  -- Respiratory - cough and congestion, no shortness of breath, no GABRIEL  -- Cardiovascular - denies chest pain, no palpitations, denies claudication  -- Gastrointestinal - denies abdominal pain, nausea, vomiting, or diarrhea  -- Genitourinary - no dysuria, no denies flank pain, no hematuria or frequency   -- Musculoskeletal - denies back pain, negative for myalgias and arthralgias   -- Neurological - no headache, denies weakness or seizure; no LOC  -- Skin - denies pallor, rash, or changes in skin. no hives or welts noted    Vital Signs  -- Her oral temperature is 97.8 °F (36.6 °C).   -- Her blood pressure is 160/80 and her pulse is 75.   -- Her respiration is 17 and oxygen saturation is 95%.      Physical Exam  -- Nursing note and vitals  reviewed  -- Constitutional: Appears well-developed and well-nourished  -- Head: Atraumatic. Normocephalic. No obvious abnormality  -- Eyes: Pupils are equal and reactive to light. Normal conjunctiva and lids  -- Nose: nasal mucosa erythema and edema; clear nasal discharge noted   -- Throat: Mucous membranes moist, pharynx normal, normal tonsils. No lesions   -- Ears: External ears and TM normal bilaterally. Normal hearing and no drainage  -- Neck: Normal range of motion. Neck supple. No masses, trachea midline  -- Cardiac: Normal rate, regular rhythm and normal heart sounds  -- Pulmonary: Normal respiratory effort, breath sounds clear to auscultation  -- Abdominal: Soft, no tenderness. Normal bowel sounds. Normal liver edge  -- Musculoskeletal: Normal range of motion, no effusions. Joints stable   -- Neurological: No focal deficits. Showed good interaction with staff  -- Vascular: Posterior tibial, dorsalis pedis and radial pulses 2+ bilaterally      Emergency Room Course     Labs     K 4.6      CO2 29   BUN 16   CREATININE 1.0    (H)   ALKPHOS 72   AST 20   ALT 12   BILITOT 1.1 (H)   ALBUMIN 3.9   PROT 7.0   WBC 6.85   HGB 12.1   HCT 37.1      TSH 0.485     Radiology  -- Chest x-ray showed no infiltrate and showed no acute pathology    Additional Work up  -- The influenza screen was negative    Medications Given  -- lidocaine HCL 10 mg/ml (1%) injection 2.1 mL (2.1 mLs Intramuscular Given 1/19/18 1837)   -- albuterol-ipratropium 2.5mg-0.5mg/3mL nebulizer solution 3 mL (3 mLs Nebulization Given 1/19/18 1826)   -- cefTRIAXone injection 1 g (1 g Intramuscular Given 1/19/18 1836)     Diagnosis  -- Acute nasopharyngitis    Disposition and Plan  -- Disposition: home  -- Condition: stable  -- Follow-up: Patient to follow up with Yunior Pagan MD in 1-2 days.  -- I advised the patient that we have found no life threatening condition today  -- At this time, I believe the patient is clinically  stable for discharge.   -- The patient acknowledges that close follow up with a MD is required   -- Patient agrees to comply with all instruction and direction given in the ER    This note is dictated on Dragon Natural Speaking word recognition program.  There are word recognition mistakes that are occasionally missed on review.    '       Colin Lowe MD  01/19/18 2033

## 2018-01-22 ENCOUNTER — TELEPHONE (OUTPATIENT)
Dept: ELECTROPHYSIOLOGY | Facility: CLINIC | Age: 83
End: 2018-01-22

## 2018-01-22 NOTE — TELEPHONE ENCOUNTER
Called pt to cnfirm her appts. Pt's daughter Radha states that she needs an appt no earlier then 10am. I informed Radha that I would get with the device clinic for an appt and I'll give her call back.

## 2018-02-13 DIAGNOSIS — Z95.0 CARDIAC PACEMAKER IN SITU: Primary | ICD-10-CM

## 2018-02-13 DIAGNOSIS — I44.2 ATRIOVENTRICULAR BLOCK, COMPLETE: ICD-10-CM

## 2018-02-13 DIAGNOSIS — I48.21 PERMANENT ATRIAL FIBRILLATION: ICD-10-CM

## 2018-02-14 RX ORDER — CLOPIDOGREL BISULFATE 75 MG/1
TABLET ORAL
Qty: 90 TABLET | Refills: 0 | Status: SHIPPED | OUTPATIENT
Start: 2018-02-14 | End: 2018-05-14 | Stop reason: SDUPTHER

## 2018-02-14 RX ORDER — ATORVASTATIN CALCIUM 80 MG/1
TABLET, FILM COATED ORAL
Qty: 90 TABLET | Refills: 0 | Status: SHIPPED | OUTPATIENT
Start: 2018-02-14 | End: 2018-05-14 | Stop reason: SDUPTHER

## 2018-02-22 ENCOUNTER — HOSPITAL ENCOUNTER (OUTPATIENT)
Dept: CARDIOLOGY | Facility: CLINIC | Age: 83
Discharge: HOME | End: 2018-02-22
Payer: MEDICARE

## 2018-02-22 ENCOUNTER — CLINICAL SUPPORT (OUTPATIENT)
Dept: ELECTROPHYSIOLOGY | Facility: CLINIC | Age: 83
End: 2018-02-22
Payer: MEDICARE

## 2018-02-22 ENCOUNTER — OFFICE VISIT (OUTPATIENT)
Dept: ELECTROPHYSIOLOGY | Facility: CLINIC | Age: 83
End: 2018-02-22
Payer: MEDICARE

## 2018-02-22 VITALS
SYSTOLIC BLOOD PRESSURE: 146 MMHG | HEIGHT: 63 IN | WEIGHT: 160 LBS | BODY MASS INDEX: 28.35 KG/M2 | DIASTOLIC BLOOD PRESSURE: 99 MMHG | HEART RATE: 73 BPM

## 2018-02-22 DIAGNOSIS — I48.21 PERMANENT ATRIAL FIBRILLATION: ICD-10-CM

## 2018-02-22 DIAGNOSIS — I10 ESSENTIAL HYPERTENSION: ICD-10-CM

## 2018-02-22 DIAGNOSIS — Z95.0 CARDIAC PACEMAKER IN SITU: ICD-10-CM

## 2018-02-22 DIAGNOSIS — I44.2 ATRIOVENTRICULAR BLOCK, COMPLETE: ICD-10-CM

## 2018-02-22 DIAGNOSIS — E78.5 HYPERLIPIDEMIA, UNSPECIFIED HYPERLIPIDEMIA TYPE: ICD-10-CM

## 2018-02-22 DIAGNOSIS — I48.21 PERMANENT ATRIAL FIBRILLATION: Primary | ICD-10-CM

## 2018-02-22 DIAGNOSIS — G81.91 RIGHT HEMIPARESIS: ICD-10-CM

## 2018-02-22 PROCEDURE — 1126F AMNT PAIN NOTED NONE PRSNT: CPT | Mod: ,,, | Performed by: INTERNAL MEDICINE

## 2018-02-22 PROCEDURE — 99213 OFFICE O/P EST LOW 20 MIN: CPT | Mod: PBBFAC,25 | Performed by: INTERNAL MEDICINE

## 2018-02-22 PROCEDURE — 93005 ELECTROCARDIOGRAM TRACING: CPT | Mod: PBBFAC | Performed by: INTERNAL MEDICINE

## 2018-02-22 PROCEDURE — 93279 PRGRMG DEV EVAL PM/LDLS PM: CPT | Mod: PBBFAC | Performed by: INTERNAL MEDICINE

## 2018-02-22 PROCEDURE — 1159F MED LIST DOCD IN RCRD: CPT | Mod: ,,, | Performed by: INTERNAL MEDICINE

## 2018-02-22 PROCEDURE — 93010 ELECTROCARDIOGRAM REPORT: CPT | Mod: S$PBB,,, | Performed by: INTERNAL MEDICINE

## 2018-02-22 PROCEDURE — 99999 PR PBB SHADOW E&M-EST. PATIENT-LVL III: CPT | Mod: PBBFAC,,, | Performed by: INTERNAL MEDICINE

## 2018-02-22 PROCEDURE — 99215 OFFICE O/P EST HI 40 MIN: CPT | Mod: S$PBB,,, | Performed by: INTERNAL MEDICINE
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
## 2018-03-05 RX ORDER — APIXABAN 5 MG/1
TABLET, FILM COATED ORAL
Qty: 180 TABLET | Refills: 1 | Status: SHIPPED | OUTPATIENT
Start: 2018-03-05 | End: 2018-08-31 | Stop reason: SDUPTHER

## 2018-03-22 ENCOUNTER — CLINICAL SUPPORT (OUTPATIENT)
Dept: ELECTROPHYSIOLOGY | Facility: CLINIC | Age: 83
End: 2018-03-22
Payer: MEDICARE

## 2018-03-22 DIAGNOSIS — I48.21 PERMANENT ATRIAL FIBRILLATION: ICD-10-CM

## 2018-03-22 DIAGNOSIS — Z95.0 CARDIAC PACEMAKER IN SITU: ICD-10-CM

## 2018-03-22 DIAGNOSIS — I44.2 ATRIOVENTRICULAR BLOCK, COMPLETE: ICD-10-CM

## 2018-03-22 PROCEDURE — 93279 PRGRMG DEV EVAL PM/LDLS PM: CPT | Mod: PBBFAC | Performed by: INTERNAL MEDICINE

## 2018-04-16 ENCOUNTER — TELEPHONE (OUTPATIENT)
Dept: ELECTROPHYSIOLOGY | Facility: CLINIC | Age: 83
End: 2018-04-16

## 2018-04-16 NOTE — TELEPHONE ENCOUNTER
Contacted patient's daughter Radha on this morning.  Informed her the patient's appointment has been rescheduled to May 11, 2017 @ 11:40.  Patient's daughter verbalized understanding and stated the 11 th @ 11:40 would be perfect for her.

## 2018-04-16 NOTE — TELEPHONE ENCOUNTER
----- Message from Tootie Van MA sent at 4/16/2018  8:18 AM CDT -----  Contact: tyler   Please call Tyler the patient daughter at [phone] she need to reschedule her appointment on 5-4- to 5-11 please schedule after 11:00 am. Thank you.

## 2018-05-11 ENCOUNTER — CLINICAL SUPPORT (OUTPATIENT)
Dept: ELECTROPHYSIOLOGY | Facility: CLINIC | Age: 83
End: 2018-05-11
Attending: INTERNAL MEDICINE
Payer: MEDICARE

## 2018-05-11 DIAGNOSIS — I48.21 PERMANENT ATRIAL FIBRILLATION: ICD-10-CM

## 2018-05-11 DIAGNOSIS — Z95.0 CARDIAC PACEMAKER IN SITU: ICD-10-CM

## 2018-05-11 DIAGNOSIS — I44.2 ATRIOVENTRICULAR BLOCK, COMPLETE: ICD-10-CM

## 2018-05-11 PROCEDURE — 93279 PRGRMG DEV EVAL PM/LDLS PM: CPT | Mod: PBBFAC | Performed by: INTERNAL MEDICINE

## 2018-05-14 RX ORDER — ATORVASTATIN CALCIUM 80 MG/1
TABLET, FILM COATED ORAL
Qty: 90 TABLET | Refills: 0 | Status: SHIPPED | OUTPATIENT
Start: 2018-05-14 | End: 2018-08-12 | Stop reason: SDUPTHER

## 2018-05-14 RX ORDER — CLOPIDOGREL BISULFATE 75 MG/1
TABLET ORAL
Qty: 90 TABLET | Refills: 0 | Status: SHIPPED | OUTPATIENT
Start: 2018-05-14 | End: 2018-08-12 | Stop reason: SDUPTHER

## 2018-06-14 DIAGNOSIS — Z95.0 CARDIAC PACEMAKER: Primary | ICD-10-CM

## 2018-06-14 DIAGNOSIS — I48.91 ATRIAL FIBRILLATION, UNSPECIFIED TYPE: ICD-10-CM

## 2018-06-15 ENCOUNTER — CLINICAL SUPPORT (OUTPATIENT)
Dept: ELECTROPHYSIOLOGY | Facility: CLINIC | Age: 83
End: 2018-06-15
Payer: MEDICARE

## 2018-06-15 ENCOUNTER — TELEPHONE (OUTPATIENT)
Dept: INTERNAL MEDICINE | Facility: CLINIC | Age: 83
End: 2018-06-15

## 2018-06-15 DIAGNOSIS — I48.91 ATRIAL FIBRILLATION, UNSPECIFIED TYPE: ICD-10-CM

## 2018-06-15 DIAGNOSIS — Z95.0 CARDIAC PACEMAKER: ICD-10-CM

## 2018-06-15 PROCEDURE — 93279 PRGRMG DEV EVAL PM/LDLS PM: CPT | Mod: PBBFAC | Performed by: INTERNAL MEDICINE

## 2018-06-15 NOTE — TELEPHONE ENCOUNTER
Fax received from [employer] indicating that Eliquis 5 mg tablet has been approved and patient has been notified. Effective 5/9/18 - 6/8/19. Case ID: [id number]

## 2018-06-24 DIAGNOSIS — R79.89 ELEVATED BRAIN NATRIURETIC PEPTIDE (BNP) LEVEL: ICD-10-CM

## 2018-06-24 RX ORDER — SPIRONOLACTONE 25 MG/1
TABLET ORAL
Qty: 90 TABLET | Refills: 2 | Status: SHIPPED | OUTPATIENT
Start: 2018-06-24 | End: 2019-08-26 | Stop reason: SDUPTHER

## 2018-07-02 ENCOUNTER — LAB VISIT (OUTPATIENT)
Dept: LAB | Facility: HOSPITAL | Age: 83
End: 2018-07-02
Attending: INTERNAL MEDICINE
Payer: MEDICARE

## 2018-07-02 ENCOUNTER — OFFICE VISIT (OUTPATIENT)
Dept: INTERNAL MEDICINE | Facility: CLINIC | Age: 83
End: 2018-07-02
Payer: MEDICARE

## 2018-07-02 VITALS
SYSTOLIC BLOOD PRESSURE: 100 MMHG | OXYGEN SATURATION: 95 % | RESPIRATION RATE: 14 BRPM | HEART RATE: 60 BPM | DIASTOLIC BLOOD PRESSURE: 52 MMHG | WEIGHT: 160.06 LBS | HEIGHT: 63 IN | BODY MASS INDEX: 28.36 KG/M2

## 2018-07-02 DIAGNOSIS — I48.21 PERMANENT ATRIAL FIBRILLATION: ICD-10-CM

## 2018-07-02 DIAGNOSIS — G81.91 RIGHT HEMIPARESIS: ICD-10-CM

## 2018-07-02 DIAGNOSIS — E78.5 HYPERLIPIDEMIA, UNSPECIFIED HYPERLIPIDEMIA TYPE: Primary | ICD-10-CM

## 2018-07-02 DIAGNOSIS — Z95.0 CARDIAC PACEMAKER IN SITU: ICD-10-CM

## 2018-07-02 DIAGNOSIS — I48.91 ATRIAL FIBRILLATION, UNSPECIFIED TYPE: ICD-10-CM

## 2018-07-02 DIAGNOSIS — I50.32 CHRONIC DIASTOLIC CHF (CONGESTIVE HEART FAILURE): ICD-10-CM

## 2018-07-02 DIAGNOSIS — E78.5 HYPERLIPIDEMIA, UNSPECIFIED HYPERLIPIDEMIA TYPE: ICD-10-CM

## 2018-07-02 DIAGNOSIS — I10 ESSENTIAL HYPERTENSION: ICD-10-CM

## 2018-07-02 LAB
ALBUMIN SERPL BCP-MCNC: 3.6 G/DL
ALP SERPL-CCNC: 59 U/L
ALT SERPL W/O P-5'-P-CCNC: 11 U/L
ANION GAP SERPL CALC-SCNC: 8 MMOL/L
AST SERPL-CCNC: 16 U/L
BASOPHILS # BLD AUTO: 0.02 K/UL
BASOPHILS NFR BLD: 0.4 %
BILIRUB SERPL-MCNC: 1 MG/DL
BNP SERPL-MCNC: 232 PG/ML
BUN SERPL-MCNC: 17 MG/DL
CALCIUM SERPL-MCNC: 10 MG/DL
CHLORIDE SERPL-SCNC: 103 MMOL/L
CHOLEST SERPL-MCNC: 87 MG/DL
CHOLEST/HDLC SERPL: 1.9 {RATIO}
CO2 SERPL-SCNC: 29 MMOL/L
CREAT SERPL-MCNC: 1 MG/DL
DIFFERENTIAL METHOD: ABNORMAL
EOSINOPHIL # BLD AUTO: 0.1 K/UL
EOSINOPHIL NFR BLD: 1.5 %
ERYTHROCYTE [DISTWIDTH] IN BLOOD BY AUTOMATED COUNT: 14.4 %
EST. GFR  (AFRICAN AMERICAN): 58 ML/MIN/1.73 M^2
EST. GFR  (NON AFRICAN AMERICAN): 50 ML/MIN/1.73 M^2
GLUCOSE SERPL-MCNC: 103 MG/DL
HCT VFR BLD AUTO: 36.4 %
HDLC SERPL-MCNC: 47 MG/DL
HDLC SERPL: 54 %
HGB BLD-MCNC: 11.4 G/DL
LDLC SERPL CALC-MCNC: 32 MG/DL
LYMPHOCYTES # BLD AUTO: 1.2 K/UL
LYMPHOCYTES NFR BLD: 22.5 %
MCH RBC QN AUTO: 29.8 PG
MCHC RBC AUTO-ENTMCNC: 31.3 G/DL
MCV RBC AUTO: 95 FL
MONOCYTES # BLD AUTO: 0.5 K/UL
MONOCYTES NFR BLD: 8.4 %
NEUTROPHILS # BLD AUTO: 3.6 K/UL
NEUTROPHILS NFR BLD: 67.2 %
NONHDLC SERPL-MCNC: 40 MG/DL
PLATELET # BLD AUTO: 184 K/UL
PMV BLD AUTO: 10.3 FL
POTASSIUM SERPL-SCNC: 4.8 MMOL/L
PROT SERPL-MCNC: 6.5 G/DL
RBC # BLD AUTO: 3.82 M/UL
SODIUM SERPL-SCNC: 140 MMOL/L
TRIGL SERPL-MCNC: 40 MG/DL
TSH SERPL DL<=0.005 MIU/L-ACNC: 2.71 UIU/ML
WBC # BLD AUTO: 5.37 K/UL

## 2018-07-02 PROCEDURE — 99214 OFFICE O/P EST MOD 30 MIN: CPT | Mod: S$PBB | Performed by: INTERNAL MEDICINE

## 2018-07-02 PROCEDURE — 99213 OFFICE O/P EST LOW 20 MIN: CPT | Mod: PBBFAC | Performed by: INTERNAL MEDICINE

## 2018-07-02 PROCEDURE — 85025 COMPLETE CBC W/AUTO DIFF WBC: CPT

## 2018-07-02 PROCEDURE — 80061 LIPID PANEL: CPT

## 2018-07-02 PROCEDURE — 99999 PR STA SHADOW: CPT | Mod: PBBFAC,,, | Performed by: INTERNAL MEDICINE

## 2018-07-02 PROCEDURE — 99999 PR PBB SHADOW E&M-EST. PATIENT-LVL III: CPT | Mod: PBBFAC,,, | Performed by: INTERNAL MEDICINE

## 2018-07-02 PROCEDURE — 80053 COMPREHEN METABOLIC PANEL: CPT

## 2018-07-02 PROCEDURE — 84443 ASSAY THYROID STIM HORMONE: CPT

## 2018-07-02 PROCEDURE — 83880 ASSAY OF NATRIURETIC PEPTIDE: CPT

## 2018-07-02 PROCEDURE — 36415 COLL VENOUS BLD VENIPUNCTURE: CPT

## 2018-07-02 RX ORDER — NYSTATIN 100000 [USP'U]/G
POWDER TOPICAL
Qty: 60 G | Refills: 11 | Status: SHIPPED | OUTPATIENT
Start: 2018-07-02 | End: 2019-08-07 | Stop reason: SDUPTHER

## 2018-07-02 NOTE — PROGRESS NOTES
Subjective:       Patient ID: Mattie Belle is a 89 y.o. female.    Chief Complaint: Hyperlipidemia (follow up with lab review); Hypertension; and Gastroesophageal Reflux    Mattie Belle is a 89 y.o. female who presents for ch anemia, CHF ,  Hypertension, and Hyperlipidemia follow up. Labs were reviewed with patient today.  Anemia highlighted with patient and daughter . Not willing to do scopes and work up       Hyperlipidemia   This is a chronic problem. The problem is controlled. Recent lipid tests were reviewed and are low. She has no history of obesity. Pertinent negatives include no chest pain, myalgias or shortness of breath.   Hypertension   This is a chronic problem. The current episode started more than 1 year ago. The problem is unchanged. The problem is controlled. Pertinent negatives include no blurred vision, chest pain, headaches, orthopnea, palpitations, PND or shortness of breath. Past treatments include beta blockers. The current treatment provides moderate improvement. Compliance problems include diet.    Gastroesophageal Reflux   She reports no abdominal pain, no chest pain, no choking, no coughing, no nausea, no sore throat or no wheezing. Pertinent negatives include no fatigue.   Medication Refill   Associated symptoms include weakness. Pertinent negatives include no abdominal pain, arthralgias, chest pain, chills, congestion, coughing, diaphoresis, fatigue, fever, headaches, myalgias, nausea, numbness, sore throat or vomiting.     Review of Systems   Constitutional: Negative for chills, diaphoresis, fatigue and fever.   HENT: Negative for congestion and sore throat.    Eyes: Negative for blurred vision.   Respiratory: Negative for cough, choking, shortness of breath and wheezing.    Cardiovascular: Negative for chest pain, palpitations, orthopnea and PND.   Gastrointestinal: Negative for abdominal distention, abdominal pain, constipation, diarrhea, nausea and vomiting.   Musculoskeletal:  Negative for arthralgias, back pain and myalgias.   Neurological: Positive for weakness. Negative for numbness and headaches.   Psychiatric/Behavioral: Negative for sleep disturbance.       Objective:      Physical Exam   Constitutional: She is oriented to person, place, and time. She appears well-developed and well-nourished.   HENT:   Head: Normocephalic and atraumatic.   Cardiovascular: Normal rate, regular rhythm and normal heart sounds.    Pulmonary/Chest: Effort normal and breath sounds normal.   Musculoskeletal: She exhibits no edema.   No passive rom to right shoulder. i can barely get any airspace in between her right arm and her lateral side/breast.    Neurological: She is alert and oriented to person, place, and time.   Skin: Skin is warm and dry.        mild erythema left    Psychiatric: She has a normal mood and affect. Her behavior is normal. Judgment and thought content normal.   Nursing note and vitals reviewed.      Assessment:       1. Hyperlipidemia, unspecified hyperlipidemia type    2. Permanent atrial fibrillation    3. Right hemiparesis    4. Chronic diastolic CHF (congestive heart failure)    5. Cardiac pacemaker in situ        Plan:   Mattie was seen today for hyperlipidemia, hypertension and gastroesophageal reflux.    Diagnoses and all orders for this visit:    Hyperlipidemia, unspecified hyperlipidemia type  -     CBC auto differential; Future  -     Comprehensive metabolic panel; Future  -     Lipid panel; Future  -     TSH; Future  Continue lipitor    Permanent atrial fibrillation  -     TSH; Future  Continue coreg     Right hemiparesis  -     Lipid panel; Future  Continue eliquis    Chronic diastolic CHF (congestive heart failure)  -     Brain natriuretic peptide; Future  Continue aldactone   Continue beta blockers     Cardiac pacemaker in situ  Seeing cardiology   ? New pacemaker soon     Other orders  -     nystatin (NYAMYC) powder; apply to affected area three times a day

## 2018-07-13 ENCOUNTER — CLINICAL SUPPORT (OUTPATIENT)
Dept: ELECTROPHYSIOLOGY | Facility: CLINIC | Age: 83
End: 2018-07-13
Attending: INTERNAL MEDICINE
Payer: MEDICARE

## 2018-07-13 DIAGNOSIS — Z95.0 CARDIAC PACEMAKER: ICD-10-CM

## 2018-07-13 DIAGNOSIS — I48.91 ATRIAL FIBRILLATION, UNSPECIFIED TYPE: ICD-10-CM

## 2018-07-13 PROCEDURE — 93279 PRGRMG DEV EVAL PM/LDLS PM: CPT | Mod: PBBFAC | Performed by: INTERNAL MEDICINE

## 2018-08-10 ENCOUNTER — CLINICAL SUPPORT (OUTPATIENT)
Dept: ELECTROPHYSIOLOGY | Facility: CLINIC | Age: 83
End: 2018-08-10
Payer: MEDICARE

## 2018-08-10 DIAGNOSIS — Z95.0 CARDIAC PACEMAKER: ICD-10-CM

## 2018-08-10 DIAGNOSIS — I48.91 ATRIAL FIBRILLATION, UNSPECIFIED TYPE: ICD-10-CM

## 2018-08-10 PROCEDURE — 93279 PRGRMG DEV EVAL PM/LDLS PM: CPT | Mod: PBBFAC | Performed by: INTERNAL MEDICINE

## 2018-08-13 RX ORDER — CLOPIDOGREL BISULFATE 75 MG/1
TABLET ORAL
Qty: 90 TABLET | Refills: 0 | Status: SHIPPED | OUTPATIENT
Start: 2018-08-13 | End: 2018-11-09 | Stop reason: SDUPTHER

## 2018-08-13 RX ORDER — ATORVASTATIN CALCIUM 80 MG/1
TABLET, FILM COATED ORAL
Qty: 90 TABLET | Refills: 0 | Status: SHIPPED | OUTPATIENT
Start: 2018-08-13 | End: 2018-09-10

## 2018-08-13 RX ORDER — CARVEDILOL 25 MG/1
TABLET ORAL
Qty: 180 TABLET | Refills: 2 | Status: SHIPPED | OUTPATIENT
Start: 2018-08-13 | End: 2018-09-10

## 2018-08-14 ENCOUNTER — TELEPHONE (OUTPATIENT)
Dept: ELECTROPHYSIOLOGY | Facility: CLINIC | Age: 83
End: 2018-08-14

## 2018-08-14 DIAGNOSIS — I44.2 COMPLETE AV BLOCK: ICD-10-CM

## 2018-08-14 DIAGNOSIS — Z95.0 CARDIAC PACEMAKER IN SITU: Primary | ICD-10-CM

## 2018-08-14 DIAGNOSIS — I50.31 ACUTE DIASTOLIC CHF (CONGESTIVE HEART FAILURE): ICD-10-CM

## 2018-08-14 NOTE — PROGRESS NOTES
IMPLANTABLE DEVICE EDUCATION CHECKLIST    PRE PROCEDURE TESTING     8-17-18 @ 11:50 AM   Pre-procedure labs have been ordered for you at:  [facility]  · Be sure to arrive at your scheduled time. YOU DO NOT HAVE TO FAST FOR THIS LAB WORK!      DAY OF PROCEDURE    8-20-18 @ 12 PM   Report to Cardiology Waiting Room on the 3rd floor of the Hospital  · Wash your chest with HIBICLENS OR AN ANTIBACTERIAL SOAP (such as Dial) on the night before and the morning of your procedure.  · Please do not wear makeup, especially mascara, when arriving for your procedure.  · Do not eat or drink anything after 12 mn on the night before your procedure    Medications  --HOLD YOUR NIGHT TIME DOSE OF ELIQUIS ON THE NIGHT PRIOR TO YOUR PROCEDURE (8-19-18).  --HOLD AM DOSE OF ELIQUIS ON THE MORNING OF YOUR PROCEDURE (8-20-18).  · You may take your other usual morning medications with a sip of water.    Directions to Cardiology Waiting Room  If you park in the Parking Garage:  Take elevators to the 2nd floor  Walk up ramp and turn right by Gold Elevators  Take elevator to the 3rd floor  Upon exiting the elevator, turn away from the clinic areas  Walk long gonzales around to front of hospital to area with windows overlooking [facility]  Check in at Reception Desk  OR  If family is dropping you off:  Have them drop you off at the front of the Hospital  (Near the ER, where all the flags are hung).  Take the E elevators to the 3rd floor.  Check in at the Reception Desk in the waiting room.        · YOU WILL BE GOING HOME AFTER YOUR PROCEDURE  · YOU WILL NEED SOMEONE TO DRIVE YOU HOME AFTER YOUR PROCEDURE  · YOUR PAIN DURING YOUR PROCEDURE WILL BE MANAGED BY THE ANESTHESIA TEAM    Any need to reschedule or cancel procedures, or any questions regarding your procedures should be addressed directly with the Arrhythmia Department Nurses at the following phone number: [phone]

## 2018-08-14 NOTE — PROGRESS NOTES
Post-Procedure Patient Discharge Instructions  Pacemaker/Defibrillator    Wound Care   If you are discharged with a standard dressing over the incision, you may remove the dressing after 24 hours.    If you are discharged with an AQUACEL dressing, you should keep it on until your follow-up appointment in 1-2 weeks.   If there are Steri-strips (strips of tape) over your incision, leave them on until your follow-up appointment. They may begin to fall off on their own, which is normal. If there is Dermabond (clear glue) over your incision, do not scrub it off. It acts as a barrier and will eventually disappear.   You may be discharged with 5 days of oral antibiotics. Please take the full prescription until it is gone.   Do not get the incision wet for 48 hours following the procedure. You may sponge bath during this period, working around the incision. After 48 hours, you may shower, but you should still try to keep this area as dry as possible, and avoid direct water contact to the incision (allow the water to hit back of your shoulder rather than directly on the incision). Gently pat the incision dry if it does get wet.   You may take regular showers after 2 weeks, unless otherwise indicated at your follow-up visit.   Do not submerge the incision in a tub, pool, or body of water for 6 weeks.   Avoid using deodorants, powders, creams, lotions, etc. on your incision for 6 weeks.   If you notice unusual swelling, redness, drainage, have more device site pain, chest pain, shortness of breath, or have a fever greater than 100 degrees, call our device clinic immediately: [phone] or [phone] during normal office hours. You may call [phone] after-hours or on weekends and ask for the electrophysiologist on call.    Activity   If you only had a battery/generator change performed, there are no postoperative activity restrictions.    If this is your first device or if you had new wires added to  your existing device, then you will be discharged in a sling which you should wear continuously for 48 hours. After that, the sling should remain off during the day but should be worn at night for another 2-4 weeks (depending on how active a sleeper you are).   Do not raise your device-side arm above your shoulder for 6 weeks. Do not lift more than 5-10 lbs with your device-side arm for 6 weeks.    If you were driving prior to the procedure, you may resume driving after your first follow-up appointment (1-2 weeks). If you have a history of passing out or a history of certain arrhythmias, there may be driving restrictions unrelated to the procedure. Please clarify with your physician if this is the case.   No heavy activity with the affected arm for 6 weeks (eg. tennis, golf, bowling, aerobics, mowing the lawn, etc.).   Avoid rough contact at the device site for 6 weeks.   You may participate in sexual activity unless otherwise instructed.   You may return to work within 3-5 days unless told otherwise, provided you adhere to the above activity restrictions.    Long-Term Instructions   Keep your pacemaker or defibrillator identification card with you at all times.   If you have a defibrillator and you get shocked by the device: If you receive one shock and you feel ok, you may call [phone] or [phone] during normal office hours. You may call [phone] after-hours. If you receive one shock and you do not feel well, call Emergency Medical Services. They will take you to the nearest emergency room.   If you have a defibrillator and you get more than one shock from the device or multiple shocks in a short period of time: Call Emergency Medical Services. They will take you to the nearest emergency room.   Appliances: You may operate any electrical device in your home, including microwaves.   Security Systems: Electromagnetic security systems can be located in the workplace, courthouses, or  other high-security areas. Exposure to this type of security system has been shown to cause interference in some cases. Interference may be related to the duration of exposure and/or the distance between the device and the security device. You should be aware of the location of security systems, move through them at a normal pace, and avoid leaning or standing too close.   Metal Detectors at Airports: Metal detectors at airports can potentially interfere with pacemakers or defibrillators, although this is unlikely. Metal detectors will likely be triggered by your device and therefore at places such as airports  it will be important for you to carry your identification card for the pacemaker/defibrillator. Airport personnel will likely prefer to do a manual search.   Cellular Phones: It is unlikely that using a cellular phone will interfere with your device. It should be used with the hand opposite to the side where your device was implanted. The phone should not be carried in the shirt pocket on the same side as the device.   Specific Work Conditions: Patients who work near high-voltage lines, transmitting towers, large motors, welding equipment, or powerful magnets should discuss their specific situation with their physician. In general, remain at least two feet from external electrical equipment, verify that the equipment is properly grounded, and wear insulated gloves when using electrical devices. Leave the immediate location if lightheadedness or other symptoms develop.   MRI: Some pacemakers and defibrillators are safe in MRI scanners, while others are not. Please consult with your physician to see if you have an MRI-compatible device.   Surgery: Should you require surgery in the future, some electrosurgical devices can interfere with your device function. You should discuss this with your surgeon before any operation.   Radiation Therapy: If you ever require radiation therapy in the future, care must be  taken to avoid irradiating the device.    Long-Term Follow-Up   Your device has the ability to transmit device information from home to the doctors office using a home monitoring system.   This remote system takes the place of a doctors visit. Your device will be checked from home every 3-6 months. Every 6-12 months, you will be asked to come into the office for an in-office check.   Your device should last in the range of 6-12 years. This depends on many factors including how often it paces the heart.   When the battery is low, a generator change will be performed. This is a same-day procedure with no post-op activity restrictions, unless one of the pacemaker or defibrillator leads needs to be replaced at that time, or a new lead is added to your existing system.

## 2018-08-14 NOTE — TELEPHONE ENCOUNTER
Spoke with patient'S DAUGHTER- TOÑITO MORALES provided potential date for EP procedure. Patient in agreement. Will sent instructions via portal and mail.     Arash ABAD CCM

## 2018-08-17 ENCOUNTER — LAB VISIT (OUTPATIENT)
Dept: LAB | Facility: HOSPITAL | Age: 83
End: 2018-08-17
Attending: INTERNAL MEDICINE
Payer: MEDICARE

## 2018-08-17 ENCOUNTER — TELEPHONE (OUTPATIENT)
Dept: ELECTROPHYSIOLOGY | Facility: CLINIC | Age: 83
End: 2018-08-17

## 2018-08-17 DIAGNOSIS — I50.31 ACUTE DIASTOLIC CHF (CONGESTIVE HEART FAILURE): ICD-10-CM

## 2018-08-17 DIAGNOSIS — Z95.0 CARDIAC PACEMAKER IN SITU: ICD-10-CM

## 2018-08-17 DIAGNOSIS — I44.2 COMPLETE AV BLOCK: ICD-10-CM

## 2018-08-17 LAB
ANION GAP SERPL CALC-SCNC: 8 MMOL/L
APTT BLDCRRT: 30.5 SEC
BASOPHILS # BLD AUTO: 0.02 K/UL
BASOPHILS NFR BLD: 0.4 %
BUN SERPL-MCNC: 16 MG/DL
CALCIUM SERPL-MCNC: 9.8 MG/DL
CHLORIDE SERPL-SCNC: 103 MMOL/L
CO2 SERPL-SCNC: 28 MMOL/L
CREAT SERPL-MCNC: 1 MG/DL
DIFFERENTIAL METHOD: ABNORMAL
EOSINOPHIL # BLD AUTO: 0.1 K/UL
EOSINOPHIL NFR BLD: 1.6 %
ERYTHROCYTE [DISTWIDTH] IN BLOOD BY AUTOMATED COUNT: 14.3 %
EST. GFR  (AFRICAN AMERICAN): 58 ML/MIN/1.73 M^2
EST. GFR  (NON AFRICAN AMERICAN): 50 ML/MIN/1.73 M^2
GLUCOSE SERPL-MCNC: 109 MG/DL
HCT VFR BLD AUTO: 35.9 %
HGB BLD-MCNC: 11.5 G/DL
INR PPP: 1.2
LYMPHOCYTES # BLD AUTO: 1.2 K/UL
LYMPHOCYTES NFR BLD: 22 %
MCH RBC QN AUTO: 30.2 PG
MCHC RBC AUTO-ENTMCNC: 32 G/DL
MCV RBC AUTO: 94 FL
MONOCYTES # BLD AUTO: 0.5 K/UL
MONOCYTES NFR BLD: 8.7 %
NEUTROPHILS # BLD AUTO: 3.8 K/UL
NEUTROPHILS NFR BLD: 67.3 %
PLATELET # BLD AUTO: 202 K/UL
PMV BLD AUTO: 10 FL
POTASSIUM SERPL-SCNC: 4.7 MMOL/L
PROTHROMBIN TIME: 11.9 SEC
RBC # BLD AUTO: 3.81 M/UL
SODIUM SERPL-SCNC: 139 MMOL/L
WBC # BLD AUTO: 5.64 K/UL

## 2018-08-17 PROCEDURE — 80048 BASIC METABOLIC PNL TOTAL CA: CPT

## 2018-08-17 PROCEDURE — 85610 PROTHROMBIN TIME: CPT

## 2018-08-17 PROCEDURE — 85025 COMPLETE CBC W/AUTO DIFF WBC: CPT

## 2018-08-17 PROCEDURE — 85730 THROMBOPLASTIN TIME PARTIAL: CPT

## 2018-08-17 PROCEDURE — 36415 COLL VENOUS BLD VENIPUNCTURE: CPT

## 2018-08-17 NOTE — TELEPHONE ENCOUNTER
Spoke with patient's daughter-  to review pre-op instructions for PPM Gen change scheduled for Monday 8-20-18. Patient reports that he/she will fast after 12:00 Midnight and [facility] 12 noon check in time. Went over which medications to hold with regards to EP procedure. Patient's daughter verbalizes understanding of all instructions and denies any further questions or concerns. Arash ABAD CCM

## 2018-08-20 ENCOUNTER — NURSE TRIAGE (OUTPATIENT)
Dept: ADMINISTRATIVE | Facility: CLINIC | Age: 83
End: 2018-08-20

## 2018-08-20 ENCOUNTER — HOSPITAL ENCOUNTER (OUTPATIENT)
Facility: HOSPITAL | Age: 83
Discharge: HOME | End: 2018-08-20
Attending: INTERNAL MEDICINE | Admitting: INTERNAL MEDICINE
Payer: MEDICARE

## 2018-08-20 ENCOUNTER — ANESTHESIA EVENT (OUTPATIENT)
Dept: MEDSURG UNIT | Facility: HOSPITAL | Age: 83
End: 2018-08-20
Payer: MEDICARE

## 2018-08-20 ENCOUNTER — ANESTHESIA (OUTPATIENT)
Dept: MEDSURG UNIT | Facility: HOSPITAL | Age: 83
End: 2018-08-20
Payer: MEDICARE

## 2018-08-20 VITALS
WEIGHT: 160 LBS | BODY MASS INDEX: 28.35 KG/M2 | HEIGHT: 63 IN | RESPIRATION RATE: 20 BRPM | SYSTOLIC BLOOD PRESSURE: 158 MMHG | TEMPERATURE: 97 F | HEART RATE: 81 BPM | OXYGEN SATURATION: 93 % | DIASTOLIC BLOOD PRESSURE: 76 MMHG

## 2018-08-20 DIAGNOSIS — E78.5 HYPERLIPIDEMIA: ICD-10-CM

## 2018-08-20 DIAGNOSIS — I44.2 COMPLETE AV BLOCK: Primary | ICD-10-CM

## 2018-08-20 DIAGNOSIS — Z45.010 ENCOUNTER FOR PACEMAKER AT END OF BATTERY LIFE: ICD-10-CM

## 2018-08-20 DIAGNOSIS — Z95.0 CARDIAC PACEMAKER IN SITU: ICD-10-CM

## 2018-08-20 DIAGNOSIS — Z45.010 ENCOUNTER FOR CHECKING AND TESTING OF CARDIAC PACEMAKER PULSE GENERATOR (BATTERY): ICD-10-CM

## 2018-08-20 DIAGNOSIS — I50.32 CHRONIC DIASTOLIC CHF (CONGESTIVE HEART FAILURE): ICD-10-CM

## 2018-08-20 PROCEDURE — 27100006 EP LAB PROCEDURE

## 2018-08-20 PROCEDURE — 63600175 PHARM REV CODE 636 W HCPCS: Performed by: INTERNAL MEDICINE

## 2018-08-20 PROCEDURE — 25000003 PHARM REV CODE 250

## 2018-08-20 PROCEDURE — 93005 ELECTROCARDIOGRAM TRACING: CPT | Mod: 59

## 2018-08-20 PROCEDURE — 37000009 HC ANESTHESIA EA ADD 15 MINS: Performed by: INTERNAL MEDICINE

## 2018-08-20 PROCEDURE — 63600175 PHARM REV CODE 636 W HCPCS

## 2018-08-20 PROCEDURE — 25000003 PHARM REV CODE 250: Performed by: NURSE ANESTHETIST, CERTIFIED REGISTERED

## 2018-08-20 PROCEDURE — D9220A PRA ANESTHESIA: Mod: CRNA,,, | Performed by: NURSE ANESTHETIST, CERTIFIED REGISTERED

## 2018-08-20 PROCEDURE — 63600175 PHARM REV CODE 636 W HCPCS: Performed by: NURSE ANESTHETIST, CERTIFIED REGISTERED

## 2018-08-20 PROCEDURE — D9220A PRA ANESTHESIA: Mod: ANES,,, | Performed by: ANESTHESIOLOGY

## 2018-08-20 PROCEDURE — 33227 REMOVE&REPLACE PM GEN SINGL: CPT | Mod: ,,, | Performed by: INTERNAL MEDICINE

## 2018-08-20 PROCEDURE — 93010 ELECTROCARDIOGRAM REPORT: CPT | Mod: ,,, | Performed by: INTERNAL MEDICINE

## 2018-08-20 PROCEDURE — 37000008 HC ANESTHESIA 1ST 15 MINUTES: Performed by: INTERNAL MEDICINE

## 2018-08-20 RX ORDER — FENTANYL CITRATE 50 UG/ML
INJECTION, SOLUTION INTRAMUSCULAR; INTRAVENOUS
Status: DISCONTINUED | OUTPATIENT
Start: 2018-08-20 | End: 2018-08-20

## 2018-08-20 RX ORDER — PROPOFOL 10 MG/ML
VIAL (ML) INTRAVENOUS
Status: DISCONTINUED | OUTPATIENT
Start: 2018-08-20 | End: 2018-08-20

## 2018-08-20 RX ORDER — CEFAZOLIN SODIUM 1 G/3ML
2 INJECTION, POWDER, FOR SOLUTION INTRAMUSCULAR; INTRAVENOUS
Status: COMPLETED | OUTPATIENT
Start: 2018-08-20 | End: 2018-08-20

## 2018-08-20 RX ORDER — CEFADROXIL 500 MG/1
500 CAPSULE ORAL EVERY 12 HOURS
Qty: 5 CAPSULE | Refills: 0 | Status: SHIPPED | OUTPATIENT
Start: 2018-08-20 | End: 2019-01-17

## 2018-08-20 RX ORDER — SODIUM CHLORIDE 0.9 % (FLUSH) 0.9 %
3 SYRINGE (ML) INJECTION
Status: DISCONTINUED | OUTPATIENT
Start: 2018-08-20 | End: 2018-08-20 | Stop reason: HOSPADM

## 2018-08-20 RX ORDER — SODIUM CHLORIDE 9 MG/ML
INJECTION, SOLUTION INTRAVENOUS CONTINUOUS PRN
Status: DISCONTINUED | OUTPATIENT
Start: 2018-08-20 | End: 2018-08-20

## 2018-08-20 RX ADMIN — PROPOFOL 20 MG: 10 INJECTION, EMULSION INTRAVENOUS at 02:08

## 2018-08-20 RX ADMIN — SODIUM CHLORIDE: 0.9 INJECTION, SOLUTION INTRAVENOUS at 02:08

## 2018-08-20 RX ADMIN — PROPOFOL 10 MG: 10 INJECTION, EMULSION INTRAVENOUS at 02:08

## 2018-08-20 RX ADMIN — FENTANYL CITRATE 25 MCG: 50 INJECTION, SOLUTION INTRAMUSCULAR; INTRAVENOUS at 02:08

## 2018-08-20 RX ADMIN — CEFAZOLIN 2 G: 330 INJECTION, POWDER, FOR SOLUTION INTRAMUSCULAR; INTRAVENOUS at 02:08

## 2018-08-20 RX ADMIN — FENTANYL CITRATE 50 MCG: 50 INJECTION, SOLUTION INTRAMUSCULAR; INTRAVENOUS at 02:08

## 2018-08-20 RX ADMIN — VANCOMYCIN HYDROCHLORIDE 750 MG: 1 INJECTION, POWDER, LYOPHILIZED, FOR SOLUTION INTRAVENOUS at 02:08
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
## 2018-08-20 NOTE — PLAN OF CARE
Problem: Patient Care Overview  Goal: Plan of Care Review  Outcome: Ongoing (interventions implemented as appropriate)  Received report from Afia. Patient s/p gen change, AAOx3. VSS, no c/o pain or discomfort at this time, resp even and unlabored. R upper chest wall MARSHA with dermbond. No active bleeding. No hematoma noted. Post procedure protocol reviewed with patient and patient's family. Understanding verbalized. Family members at bedside. Nurse call bell within reach. Will continue to monitor per post procedure protocol.
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
## 2018-08-20 NOTE — ANESTHESIA PREPROCEDURE EVALUATION
08/20/2018  aMttie Belle is a 89 y.o., female.    Anesthesia Evaluation    I have reviewed the Patient Summary Reports.    I have reviewed the Nursing Notes.      Review of Systems  Anesthesia Hx:  No problems with previous Anesthesia    Cardiovascular:   Hypertension Dysrhythmias CHF    Hepatic/GI:   GERD    Neurological:   CVA        Physical Exam  General:  Well nourished, Obesity    Airway/Jaw/Neck:  Airway Findings: Mouth Opening: Normal Tongue: Normal  TM Distance: Normal, at least 6 cm  Jaw/Neck Findings:  Neck ROM: Normal ROM     Eyes/Ears/Nose:  Eyes/Ears/Nose Findings:    Dental:  Dental Findings: Upper Dentures, Lower Dentures   Chest/Lungs:  Chest/Lungs Findings: Normal Respiratory Rate     Heart/Vascular:  Heart Findings: Rate: Normal  Rhythm: Regular Rhythm        Mental Status:  Mental Status Findings:  Cooperative, Alert and Oriented         Anesthesia Plan  Type of Anesthesia, risks & benefits discussed:  Anesthesia Type:  general  Patient's Preference: General  Intra-op Monitoring Plan: standard ASA monitors  Intra-op Monitoring Plan Comments:   Post Op Pain Control Plan:   Post Op Pain Control Plan Comments:   Induction:   IV  Beta Blocker:  Patient is not currently on a Beta-Blocker (No further documentation required).       Informed Consent: Patient understands risks and agrees with Anesthesia plan.  Questions answered. Anesthesia consent signed with patient.  ASA Score: 3     Day of Surgery Review of History & Physical:    H&P update referred to the surgeon.         Ready For Surgery From Anesthesia Perspective.

## 2018-08-20 NOTE — TELEPHONE ENCOUNTER
Caregiver called to report the following:     -caregiver states that Duricef is not at [facility]   -[facility] in [place] has not answered to confirm receipt of Rx   -daughter request that Rx be sent to [facility]   -Duricef 500 mg 1 cap Every 12 hours called to [facility] to [facility]   -daughter verbalized understanding     Education completed per [facility] Care Advice including follow up with pharmacy and provider. Caregiver verbalized understanding.    Reason for Disposition   [1] Prescription not at pharmacy AND [2] was prescribed today by PCP    Protocols used: ST MEDICATION QUESTION CALL-A-AH

## 2018-08-20 NOTE — TRANSFER OF CARE
"Anesthesia Transfer of Care Note    Patient: Mattie Belle    Procedure(s) Performed: Procedure(s) (LRB):  REPLACEMENT, PACEMAKER GENERATOR (N/A)    Patient location: Cath Lab    Anesthesia Type: general    Transport from OR: Transported from OR on room air with adequate spontaneous ventilation    Post pain: adequate analgesia    Post assessment: no apparent anesthetic complications    Post vital signs: stable    Level of consciousness: awake    Nausea/Vomiting: no nausea/vomiting    Complications: none    Transfer of care protocol was followed      Last vitals:   Visit Vitals  BP (!) 199/93 (BP Location: Left arm, Patient Position: Lying)   Pulse 76   Temp 36.6 °C (97.9 °F) (Oral)   Resp 18   Ht 5' 3" (1.6 m)   Wt 72.6 kg (160 lb)   LMP  (LMP Unknown)   SpO2 96%   Breastfeeding? No   BMI 28.34 kg/m²     "

## 2018-08-20 NOTE — PLAN OF CARE
Vss. afib noted.  Pt with right chest wall with dermabond sylvia, well approx. Scant amount of sang drainage to ice pack. Ice pack over site.  Pt complains of butt hurting. Pt pulled up in bed and repositioned, hob elevated.  See flowsheet for full assessment. Pt's daughter re updated by pacu rn. In sscu #5

## 2018-08-20 NOTE — HOSPITAL COURSE
Patient presented for generator change 8/20/18. Tolerated procedure well, no complications. Discharged home in stable condition. Continue Plavix. Hold Eliquis for 2 days. Duricef for 5 doses. Follow up in device clinic in 1 week and with Dr. Dami Du in 3 months.
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
## 2018-08-20 NOTE — NURSING TRANSFER
Nursing Transfer Note      8/20/2018     Transfer To: sscu 5 from ep pacu 4    Transfer via stretcher    Transfer with none    Transported by [facility] cath lab pct    Medicines sent: none2    Chart send with patient: Yes    Notified: daughter    Patient reassessed at: 8/20/18 1600    Upon arrival to floor: patient oriented to room, call bell in reach and bed in lowest position

## 2018-08-20 NOTE — DISCHARGE INSTRUCTIONS
Patient is status post generator change. She will need the followin. Sling to left arm - wear for 48 hours, then only at night for 6 weeks.   2. Duricef 500mg twice a day for 5 days doses   3. Patient may shower in 48 hours, do not let beam of shower hit site directly and no scrubbing in area. Do not submerge incision site in water for 2 weeks.    4. Hold anticoagulation for 2 days upon discharge and then resume.   5. Ok to resume Plavix upon discharge  6. Follow up in device clinic in 1 week and with Dr. Dami Du in 3 months.

## 2018-08-21 ENCOUNTER — TELEPHONE (OUTPATIENT)
Dept: ELECTROPHYSIOLOGY | Facility: CLINIC | Age: 83
End: 2018-08-21

## 2018-08-21 NOTE — TELEPHONE ENCOUNTER
ROSA Hooks-   Caregiver called to report the following:     -caregiver states that Duricef is not at [facility]   -[facility] in [place] has not answered to confirm receipt of Rx   -daughter request that Rx be sent to [facility]   -Duricef 500 mg 1 cap Every 12 hours called to [facility]   -daughter verbalized understanding     Education completed per [facility] Care Advice including follow up with pharmacy and provider. Caregiver verbalized understanding.     Thank you,   Ruth Faciane, RN [facility] (Routing comment)

## 2018-08-21 NOTE — TELEPHONE ENCOUNTER
Spoke with Patient's daughter- , She reports that [facility] and they were able to  the medication last night and patient started on last night.      Arash ABAD CCM

## 2018-08-24 NOTE — ANESTHESIA POSTPROCEDURE EVALUATION
"Anesthesia Post Evaluation    Patient: Mattie Belle    Procedure(s) Performed: Procedure(s) (LRB):  REPLACEMENT, PACEMAKER GENERATOR (N/A)    Final Anesthesia Type: general  Patient location during evaluation: PACU  Patient participation: Yes- Able to Participate  Level of consciousness: awake and alert, oriented and awake  Post-procedure vital signs: reviewed and stable  Pain management: adequate  Airway patency: patent  PONV status at discharge: No PONV  Anesthetic complications: no      Cardiovascular status: blood pressure returned to baseline and hemodynamically stable  Respiratory status: unassisted, spontaneous ventilation and room air  Hydration status: euvolemic  Follow-up not needed.        Visit Vitals  BP (!) 158/76   Pulse 81   Temp 36.1 °C (97 °F) (Oral)   Resp 20   Ht 5' 3" (1.6 m)   Wt 72.6 kg (160 lb)   LMP  (LMP Unknown)   SpO2 (!) 93%   Breastfeeding? No   BMI 28.34 kg/m²       Pain/Kylah Score: No Data Recorded      "

## 2018-08-28 ENCOUNTER — CLINICAL SUPPORT (OUTPATIENT)
Dept: ELECTROPHYSIOLOGY | Facility: CLINIC | Age: 83
End: 2018-08-28
Attending: INTERNAL MEDICINE
Payer: MEDICARE

## 2018-08-28 DIAGNOSIS — Z95.0 CARDIAC PACEMAKER: ICD-10-CM

## 2018-08-28 DIAGNOSIS — I48.91 ATRIAL FIBRILLATION, UNSPECIFIED TYPE: ICD-10-CM

## 2018-08-28 PROCEDURE — 93279 PRGRMG DEV EVAL PM/LDLS PM: CPT | Mod: 26,,, | Performed by: INTERNAL MEDICINE

## 2018-08-28 PROCEDURE — 93279 PRGRMG DEV EVAL PM/LDLS PM: CPT

## 2018-09-04 RX ORDER — APIXABAN 5 MG/1
TABLET, FILM COATED ORAL
Qty: 180 TABLET | Refills: 1 | Status: SHIPPED | OUTPATIENT
Start: 2018-09-04 | End: 2018-09-10

## 2018-09-10 ENCOUNTER — OFFICE VISIT (OUTPATIENT)
Dept: INTERNAL MEDICINE | Facility: CLINIC | Age: 83
End: 2018-09-10
Payer: MEDICARE

## 2018-09-10 ENCOUNTER — PATIENT MESSAGE (OUTPATIENT)
Dept: INTERNAL MEDICINE | Facility: CLINIC | Age: 83
End: 2018-09-10

## 2018-09-10 VITALS
SYSTOLIC BLOOD PRESSURE: 116 MMHG | WEIGHT: 160.06 LBS | RESPIRATION RATE: 14 BRPM | HEART RATE: 69 BPM | HEIGHT: 63 IN | DIASTOLIC BLOOD PRESSURE: 60 MMHG | OXYGEN SATURATION: 95 % | BODY MASS INDEX: 28.36 KG/M2

## 2018-09-10 DIAGNOSIS — K21.9 GASTROESOPHAGEAL REFLUX DISEASE WITHOUT ESOPHAGITIS: Primary | ICD-10-CM

## 2018-09-10 PROCEDURE — 99213 OFFICE O/P EST LOW 20 MIN: CPT | Mod: S$PBB | Performed by: INTERNAL MEDICINE

## 2018-09-10 PROCEDURE — 99213 OFFICE O/P EST LOW 20 MIN: CPT | Mod: PBBFAC | Performed by: INTERNAL MEDICINE

## 2018-09-10 PROCEDURE — 99999 PR STA SHADOW: CPT | Mod: PBBFAC,,, | Performed by: INTERNAL MEDICINE

## 2018-09-10 PROCEDURE — 99999 PR PBB SHADOW E&M-EST. PATIENT-LVL III: CPT | Mod: PBBFAC,,, | Performed by: INTERNAL MEDICINE

## 2018-09-10 RX ORDER — SUCRALFATE 1 G/1
1 TABLET ORAL 4 TIMES DAILY
Qty: 120 TABLET | Refills: 0 | Status: SHIPPED | OUTPATIENT
Start: 2018-09-10 | End: 2018-10-10

## 2018-09-10 RX ORDER — ATORVASTATIN CALCIUM 40 MG/1
40 TABLET, FILM COATED ORAL DAILY
Qty: 90 TABLET | Refills: 1 | Status: SHIPPED | OUTPATIENT
Start: 2018-09-10 | End: 2019-03-21 | Stop reason: SDUPTHER

## 2018-09-10 RX ORDER — CARVEDILOL 12.5 MG/1
12.5 TABLET ORAL 2 TIMES DAILY
Qty: 180 TABLET | Refills: 1 | Status: SHIPPED | OUTPATIENT
Start: 2018-09-10 | End: 2019-03-21 | Stop reason: SDUPTHER

## 2018-09-10 RX ORDER — PANTOPRAZOLE SODIUM 40 MG/1
TABLET, DELAYED RELEASE ORAL
Qty: 90 TABLET | Refills: 3 | Status: ON HOLD | OUTPATIENT
Start: 2018-09-10 | End: 2019-09-08 | Stop reason: SDUPTHER

## 2018-09-10 NOTE — TELEPHONE ENCOUNTER
I have ordered atorvastatin 40 mg one daily  And also carvedilol 12.5 mg 1 twice a day .  spironolactone comes as such ; so she can take one every other day   That way will not have to cut it .  meds are sent in to mail order pharmacy

## 2018-09-10 NOTE — PROGRESS NOTES
Subjective:       Patient ID: Mattie Belle is a 89 y.o. female.    Chief Complaint: Gastroesophageal Reflux    Mattie Belle is a 89 y.o. female  Here with indigestion for few weeks.  She is on Protonix and ranitidine.  But not getting better . C/o epigastric discomfort   No dysphagia. But with stroke her neck is turned on R side           Review of Systems   Constitutional: Negative for chills, diaphoresis, fatigue and fever.   HENT: Negative for congestion and sore throat.    Respiratory: Negative for cough, choking, shortness of breath and wheezing.    Cardiovascular: Negative for chest pain and palpitations.   Gastrointestinal: Negative for abdominal distention, abdominal pain, constipation, diarrhea, nausea and vomiting.   Musculoskeletal: Negative for arthralgias, back pain and myalgias.   Neurological: Positive for weakness. Negative for numbness and headaches.   Psychiatric/Behavioral: Negative for sleep disturbance.       Objective:      Physical Exam   Constitutional: She is oriented to person, place, and time. She appears well-developed and well-nourished.   HENT:   Head: Normocephalic and atraumatic.   Cardiovascular: Normal rate, regular rhythm and normal heart sounds.   Pulmonary/Chest: Effort normal and breath sounds normal.   Musculoskeletal: She exhibits no edema.   No passive rom to right shoulder. i can barely get any airspace in between her right arm and her lateral side/breast.    Neurological: She is alert and oriented to person, place, and time.   Skin: Skin is warm and dry.        mild erythema left    Psychiatric: She has a normal mood and affect. Her behavior is normal. Judgment and thought content normal.   Nursing note and vitals reviewed.      Assessment:       1. Gastroesophageal reflux disease without esophagitis        Plan:   Mattie was seen today for gastroesophageal reflux.    Diagnoses and all orders for this visit:    Gastroesophageal reflux disease without esophagitis  -      sucralfate (CARAFATE) 1 gram tablet; Take 1 tablet (1 g total) by mouth 4 (four) times daily.  Continue protonix and zantac.  If not better EGD.        She is 89 yrs old.  Take eliquis 2.5 mg bid.    -     apixaban 2.5 mg Tab; Take 1 tablet (2.5 mg total) by mouth 2 (two) times daily.  Dispense: 180 tablet; Refill: 1

## 2018-10-25 ENCOUNTER — TELEPHONE (OUTPATIENT)
Dept: ELECTROPHYSIOLOGY | Facility: CLINIC | Age: 83
End: 2018-10-25

## 2018-10-25 NOTE — TELEPHONE ENCOUNTER
Called in talk to patient daughter and she decided that she wants to keep 1/17 appointment for her mother

## 2018-11-12 RX ORDER — CLOPIDOGREL BISULFATE 75 MG/1
TABLET ORAL
Qty: 90 TABLET | Refills: 0 | Status: SHIPPED | OUTPATIENT
Start: 2018-11-12 | End: 2019-02-08 | Stop reason: SDUPTHER

## 2019-01-17 ENCOUNTER — HOSPITAL ENCOUNTER (OUTPATIENT)
Dept: CARDIOLOGY | Facility: CLINIC | Age: 84
Discharge: HOME | End: 2019-01-17
Payer: MEDICARE

## 2019-01-17 ENCOUNTER — OFFICE VISIT (OUTPATIENT)
Dept: ELECTROPHYSIOLOGY | Facility: CLINIC | Age: 84
End: 2019-01-17
Payer: MEDICARE

## 2019-01-17 ENCOUNTER — CLINICAL SUPPORT (OUTPATIENT)
Dept: CARDIOLOGY | Facility: HOSPITAL | Age: 84
End: 2019-01-17
Payer: MEDICARE

## 2019-01-17 VITALS
HEIGHT: 63 IN | DIASTOLIC BLOOD PRESSURE: 64 MMHG | BODY MASS INDEX: 28.36 KG/M2 | HEART RATE: 71 BPM | WEIGHT: 160.06 LBS | SYSTOLIC BLOOD PRESSURE: 112 MMHG

## 2019-01-17 DIAGNOSIS — Z95.0 CARDIAC PACEMAKER IN SITU: ICD-10-CM

## 2019-01-17 DIAGNOSIS — I48.21 PERMANENT ATRIAL FIBRILLATION: ICD-10-CM

## 2019-01-17 DIAGNOSIS — I44.2 ATRIOVENTRICULAR BLOCK, COMPLETE: ICD-10-CM

## 2019-01-17 DIAGNOSIS — Z95.0 CARDIAC PACEMAKER IN SITU: Primary | ICD-10-CM

## 2019-01-17 DIAGNOSIS — I48.21 PERMANENT ATRIAL FIBRILLATION: Primary | ICD-10-CM

## 2019-01-17 DIAGNOSIS — I44.2 COMPLETE AV BLOCK: ICD-10-CM

## 2019-01-17 DIAGNOSIS — I10 ESSENTIAL HYPERTENSION: ICD-10-CM

## 2019-01-17 PROCEDURE — 93279 PRGRMG DEV EVAL PM/LDLS PM: CPT

## 2019-01-17 PROCEDURE — 93005 ELECTROCARDIOGRAM TRACING: CPT | Mod: PBBFAC | Performed by: INTERNAL MEDICINE

## 2019-01-17 PROCEDURE — 99213 OFFICE O/P EST LOW 20 MIN: CPT | Mod: PBBFAC,25 | Performed by: NURSE PRACTITIONER

## 2019-01-17 PROCEDURE — 93010 RHYTHM STRIP: ICD-10-PCS | Mod: S$PBB,,, | Performed by: INTERNAL MEDICINE

## 2019-01-17 PROCEDURE — 93010 ELECTROCARDIOGRAM REPORT: CPT | Mod: S$PBB,,, | Performed by: INTERNAL MEDICINE

## 2019-01-17 PROCEDURE — 99214 OFFICE O/P EST MOD 30 MIN: CPT | Mod: S$PBB,,, | Performed by: NURSE PRACTITIONER

## 2019-01-17 PROCEDURE — 99999 PR PBB SHADOW E&M-EST. PATIENT-LVL III: CPT | Mod: PBBFAC,,, | Performed by: NURSE PRACTITIONER

## 2019-01-17 PROCEDURE — 99214 PR OFFICE/OUTPT VISIT, EST, LEVL IV, 30-39 MIN: ICD-10-PCS | Mod: S$PBB,,, | Performed by: NURSE PRACTITIONER

## 2019-01-17 PROCEDURE — 99999 PR PBB SHADOW E&M-EST. PATIENT-LVL III: ICD-10-PCS | Mod: PBBFAC,,, | Performed by: NURSE PRACTITIONER
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
## 2019-02-11 ENCOUNTER — LAB VISIT (OUTPATIENT)
Dept: LAB | Facility: HOSPITAL | Age: 84
End: 2019-02-11
Attending: INTERNAL MEDICINE
Payer: MEDICARE

## 2019-02-11 ENCOUNTER — OFFICE VISIT (OUTPATIENT)
Dept: INTERNAL MEDICINE | Facility: CLINIC | Age: 84
End: 2019-02-11
Payer: MEDICARE

## 2019-02-11 VITALS
DIASTOLIC BLOOD PRESSURE: 50 MMHG | OXYGEN SATURATION: 97 % | HEIGHT: 63 IN | WEIGHT: 160.06 LBS | SYSTOLIC BLOOD PRESSURE: 116 MMHG | RESPIRATION RATE: 12 BRPM | BODY MASS INDEX: 28.36 KG/M2 | HEART RATE: 67 BPM

## 2019-02-11 DIAGNOSIS — I48.21 PERMANENT ATRIAL FIBRILLATION: ICD-10-CM

## 2019-02-11 DIAGNOSIS — I10 ESSENTIAL HYPERTENSION: ICD-10-CM

## 2019-02-11 DIAGNOSIS — E78.5 HYPERLIPIDEMIA, UNSPECIFIED HYPERLIPIDEMIA TYPE: Primary | ICD-10-CM

## 2019-02-11 DIAGNOSIS — E78.5 HYPERLIPIDEMIA, UNSPECIFIED HYPERLIPIDEMIA TYPE: ICD-10-CM

## 2019-02-11 DIAGNOSIS — G81.91 RIGHT HEMIPARESIS: ICD-10-CM

## 2019-02-11 DIAGNOSIS — K21.9 GASTROESOPHAGEAL REFLUX DISEASE WITHOUT ESOPHAGITIS: ICD-10-CM

## 2019-02-11 DIAGNOSIS — I50.32 CHRONIC DIASTOLIC CHF (CONGESTIVE HEART FAILURE): ICD-10-CM

## 2019-02-11 DIAGNOSIS — I63.312 CEREBRAL INFARCTION DUE TO THROMBOSIS OF LEFT MIDDLE CEREBRAL ARTERY: ICD-10-CM

## 2019-02-11 LAB
ALBUMIN SERPL BCP-MCNC: 3.6 G/DL
ALP SERPL-CCNC: 65 U/L
ALT SERPL W/O P-5'-P-CCNC: 8 U/L
ANION GAP SERPL CALC-SCNC: 7 MMOL/L
AST SERPL-CCNC: 15 U/L
BASOPHILS # BLD AUTO: 0.02 K/UL
BASOPHILS NFR BLD: 0.4 %
BILIRUB SERPL-MCNC: 0.7 MG/DL
BNP SERPL-MCNC: 255 PG/ML
BUN SERPL-MCNC: 20 MG/DL
CALCIUM SERPL-MCNC: 10 MG/DL
CHLORIDE SERPL-SCNC: 103 MMOL/L
CHOLEST SERPL-MCNC: 87 MG/DL
CHOLEST/HDLC SERPL: 1.9 {RATIO}
CO2 SERPL-SCNC: 31 MMOL/L
CREAT SERPL-MCNC: 1.1 MG/DL
DIFFERENTIAL METHOD: ABNORMAL
EOSINOPHIL # BLD AUTO: 0.1 K/UL
EOSINOPHIL NFR BLD: 1.5 %
ERYTHROCYTE [DISTWIDTH] IN BLOOD BY AUTOMATED COUNT: 14.9 %
EST. GFR  (AFRICAN AMERICAN): 51 ML/MIN/1.73 M^2
EST. GFR  (NON AFRICAN AMERICAN): 45 ML/MIN/1.73 M^2
GLUCOSE SERPL-MCNC: 101 MG/DL
HCT VFR BLD AUTO: 38.1 %
HDLC SERPL-MCNC: 47 MG/DL
HDLC SERPL: 54 %
HGB BLD-MCNC: 11.6 G/DL
LDLC SERPL CALC-MCNC: 29.8 MG/DL
LYMPHOCYTES # BLD AUTO: 1.3 K/UL
LYMPHOCYTES NFR BLD: 23.5 %
MCH RBC QN AUTO: 29.1 PG
MCHC RBC AUTO-ENTMCNC: 30.4 G/DL
MCV RBC AUTO: 96 FL
MONOCYTES # BLD AUTO: 0.5 K/UL
MONOCYTES NFR BLD: 8.9 %
NEUTROPHILS # BLD AUTO: 3.5 K/UL
NEUTROPHILS NFR BLD: 65.7 %
NONHDLC SERPL-MCNC: 40 MG/DL
PLATELET # BLD AUTO: 178 K/UL
PMV BLD AUTO: 10.3 FL
POTASSIUM SERPL-SCNC: 4.6 MMOL/L
PROT SERPL-MCNC: 6.7 G/DL
RBC # BLD AUTO: 3.99 M/UL
SODIUM SERPL-SCNC: 141 MMOL/L
TRIGL SERPL-MCNC: 51 MG/DL
TSH SERPL DL<=0.005 MIU/L-ACNC: 3.08 UIU/ML
WBC # BLD AUTO: 5.31 K/UL

## 2019-02-11 PROCEDURE — 99213 OFFICE O/P EST LOW 20 MIN: CPT | Mod: PBBFAC | Performed by: INTERNAL MEDICINE

## 2019-02-11 PROCEDURE — 99214 OFFICE O/P EST MOD 30 MIN: CPT | Mod: S$PBB | Performed by: INTERNAL MEDICINE

## 2019-02-11 PROCEDURE — 83880 ASSAY OF NATRIURETIC PEPTIDE: CPT

## 2019-02-11 PROCEDURE — 99999 PR STA SHADOW: CPT | Mod: PBBFAC,,, | Performed by: INTERNAL MEDICINE

## 2019-02-11 PROCEDURE — 84443 ASSAY THYROID STIM HORMONE: CPT

## 2019-02-11 PROCEDURE — 99999 PR PBB SHADOW E&M-EST. PATIENT-LVL III: CPT | Mod: PBBFAC,,, | Performed by: INTERNAL MEDICINE

## 2019-02-11 PROCEDURE — 85025 COMPLETE CBC W/AUTO DIFF WBC: CPT

## 2019-02-11 PROCEDURE — 80053 COMPREHEN METABOLIC PANEL: CPT

## 2019-02-11 PROCEDURE — 99999 PR STA SHADOW: ICD-10-PCS | Mod: PBBFAC,,, | Performed by: INTERNAL MEDICINE

## 2019-02-11 PROCEDURE — 36415 COLL VENOUS BLD VENIPUNCTURE: CPT

## 2019-02-11 PROCEDURE — 80061 LIPID PANEL: CPT

## 2019-02-11 RX ORDER — CLOPIDOGREL BISULFATE 75 MG/1
TABLET ORAL
Qty: 90 TABLET | Refills: 0 | Status: SHIPPED | OUTPATIENT
Start: 2019-02-11 | End: 2019-05-11 | Stop reason: SDUPTHER

## 2019-02-11 NOTE — PROGRESS NOTES
Subjective:       Patient ID: Mattie Belle is a 89 y.o. female.    Chief Complaint: Hyperlipidemia (follow up with lab review); Hypertension; and Gastroesophageal Reflux    Mattie Belle is a 89 y.o. female who presents for ch anemia, CHF ,  Hypertension, and Hyperlipidemia follow up. Labs were reviewed with patient today.  Anemia highlighted with patient and daughter .       Hyperlipidemia   This is a chronic problem. The problem is controlled. Recent lipid tests were reviewed and are low. She has no history of obesity. Pertinent negatives include no chest pain, myalgias or shortness of breath.   Hypertension   This is a chronic problem. The current episode started more than 1 year ago. The problem is unchanged. The problem is controlled. Pertinent negatives include no blurred vision, chest pain, headaches, orthopnea, palpitations, PND or shortness of breath. Past treatments include beta blockers. The current treatment provides moderate improvement. Compliance problems include diet.    Gastroesophageal Reflux   She reports no abdominal pain, no chest pain, no choking, no coughing, no nausea, no sore throat or no wheezing. Pertinent negatives include no fatigue.   Medication Refill   Associated symptoms include weakness. Pertinent negatives include no abdominal pain, arthralgias, chest pain, chills, congestion, coughing, diaphoresis, fatigue, fever, headaches, myalgias, nausea, numbness, sore throat or vomiting.     Review of Systems   Constitutional: Negative for chills, diaphoresis, fatigue and fever.   HENT: Negative for congestion and sore throat.    Eyes: Negative for blurred vision.   Respiratory: Negative for cough, choking, shortness of breath and wheezing.    Cardiovascular: Negative for chest pain, palpitations, orthopnea and PND.   Gastrointestinal: Negative for abdominal distention, abdominal pain, constipation, diarrhea, nausea and vomiting.   Musculoskeletal: Negative for arthralgias, back pain and  myalgias.   Neurological: Positive for weakness. Negative for numbness and headaches.   Psychiatric/Behavioral: Negative for sleep disturbance.       Objective:      Physical Exam   Constitutional: She is oriented to person, place, and time. She appears well-developed and well-nourished.   HENT:   Head: Normocephalic and atraumatic.   Cardiovascular: Normal rate, regular rhythm and normal heart sounds.   Pulmonary/Chest: Effort normal and breath sounds normal.   Musculoskeletal: She exhibits no edema.   No passive rom to right shoulder. i can barely get any airspace in between her right arm and her lateral side/breast.    Neurological: She is alert and oriented to person, place, and time.   Skin: Skin is warm and dry.        mild erythema left    Psychiatric: She has a normal mood and affect. Her behavior is normal. Judgment and thought content normal.   Nursing note and vitals reviewed.      Assessment:       1. Hyperlipidemia, unspecified hyperlipidemia type    2. Essential hypertension    3. 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 Permanent atrial fibrillation    5. Gastroesophageal reflux disease without esophagitis        Plan:   Mattie was seen today for hyperlipidemia, hypertension and gastroesophageal reflux.    Diagnoses and all orders for this visit:    Hyperlipidemia, unspecified hyperlipidemia type  -     Lipid panel; Future  -     TSH; Future  Limit the cholesterol in your diet to less than 300 mg per day.   Fats should contribute no more than 20 to 35% of your daily calories.   Less than 7 to 10% of your calories should come from saturated fat.   Avoid saturated fat products e.g., Butter, some oils, meat, and poultry fat contain a lot of saturated fat.   Check food labels for fat and cholesterol content. Choose the foods with less fat per serving.   Limit the amount of butter and margarine you eat.   Use salad dressings and margarine made with polyunsaturated and  monounsaturated fats.   Use egg whites or egg substitutes rather than whole eggs.   Replace whole-milk dairy products with nonfat or low-fat milk, cheese, spreads, and yogurt.   Eat skinless chicken, turkey, fish, and meatless entrees more often than red meat.   Choose lean cuts of meat and trim off all visible fat. Keep portion sizes moderate.   Avoid fatty desserts such as ice cream, cream-filled cakes, and cheesecakes. Choose fresh fruits, nonfat frozen yogurt, Popsicles, etc.   Reduce the amount of fried foods, vending machine food, and fast food you eat.   Eat fruits and vegetables (especially fresh fruits and leafy vegetables), beans, and whole grains daily. The fiber in these foods helps lower cholesterol.   Look for low-fat or nonfat varieties of the foods you like to eat, or look for substitutes.   You may need to exercise 60 minutes a day to prevent weight gain and 90 minutes a day to lose weight.  Essential hypertension  -     CBC auto differential; Future  -     Comprehensive metabolic panel; Future    Well controlled.  Continue same medication and dose.  1. Keep weight close to ideal body weight.   2.   Avoid high salt foods (olives, pickles, smoked meats, salted potato chips, etc.).   Do not add salt to your food at the table.   Use only small amounts of salt when cooking.   3. Begin an exercise program. Discuss with your doctor what type of exercise program would be best for you. It doesn't have to be difficult. Even brisk walking for 20 minutes three times a week is a good form of exercise.   4. Avoid medicines which contain heart stimulants. This includes many cold and sinus decongestant pills and sprays as well as diet pills. Check the warnings about hypertension on the label. Stimulants such as amphetamine or cocaine could be lethal for someone with hypertension. Never take these.    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 The simple steps outlined below may be all youll need to relieve discomfort.  · Avoid fatty foods and spicy foods.  · Eat fewer acidic foods, such as citrus and tomato-based foods. These can increase symptoms.  · Limit drinking alcohol, caffeine, and fizzy beverages. All increase acid reflux.  · Try limiting chocolate, peppermint, and spearmint. These can worsen acid reflux in some people.  Watch When You Eat  · Avoid lying down for 3 hours after eating.  · Do not snack before going to bed.  Raise Your Head    Raising your head and upper body by 4" to 6" helps limit reflux when youre lying down. Put blocks under the head of the bed frame to raise it.      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

## 2019-02-20 RX ORDER — APIXABAN 2.5 MG/1
TABLET, FILM COATED ORAL
Qty: 180 TABLET | Refills: 1 | Status: SHIPPED | OUTPATIENT
Start: 2019-02-20 | End: 2019-08-03 | Stop reason: SDUPTHER

## 2019-03-21 RX ORDER — ATORVASTATIN CALCIUM 40 MG/1
TABLET, FILM COATED ORAL
Qty: 90 TABLET | Refills: 1 | Status: SHIPPED | OUTPATIENT
Start: 2019-03-21 | End: 2019-08-26 | Stop reason: SDUPTHER

## 2019-03-21 RX ORDER — CARVEDILOL 12.5 MG/1
TABLET ORAL
Qty: 180 TABLET | Refills: 1 | Status: SHIPPED | OUTPATIENT
Start: 2019-03-21 | End: 2019-08-25 | Stop reason: SDUPTHER

## 2019-05-13 RX ORDER — CLOPIDOGREL BISULFATE 75 MG/1
TABLET ORAL
Qty: 90 TABLET | Refills: 0 | Status: SHIPPED | OUTPATIENT
Start: 2019-05-13 | End: 2019-06-14 | Stop reason: SDUPTHER

## 2019-06-13 ENCOUNTER — PATIENT MESSAGE (OUTPATIENT)
Dept: INTERNAL MEDICINE | Facility: CLINIC | Age: 84
End: 2019-06-13

## 2019-06-17 RX ORDER — CLOPIDOGREL BISULFATE 75 MG/1
75 TABLET ORAL DAILY
Qty: 90 TABLET | Refills: 3 | Status: SHIPPED | OUTPATIENT
Start: 2019-06-17

## 2019-08-05 RX ORDER — APIXABAN 2.5 MG/1
TABLET, FILM COATED ORAL
Qty: 180 TABLET | Refills: 1 | Status: SHIPPED | OUTPATIENT
Start: 2019-08-05

## 2019-08-07 RX ORDER — NYSTATIN 100000 [USP'U]/G
POWDER TOPICAL
Qty: 60 G | Refills: 0 | Status: SHIPPED | OUTPATIENT
Start: 2019-08-07

## 2019-08-08 ENCOUNTER — TELEPHONE (OUTPATIENT)
Dept: INTERNAL MEDICINE | Facility: CLINIC | Age: 84
End: 2019-08-08

## 2019-08-09 NOTE — TELEPHONE ENCOUNTER
Fax received from [employer] advising that elquies has been approved effective 7/9/19 - 8/7/20.

## 2019-08-26 ENCOUNTER — OFFICE VISIT (OUTPATIENT)
Dept: INTERNAL MEDICINE | Facility: CLINIC | Age: 84
End: 2019-08-26
Payer: MEDICARE

## 2019-08-26 ENCOUNTER — LAB VISIT (OUTPATIENT)
Dept: LAB | Facility: HOSPITAL | Age: 84
End: 2019-08-26
Attending: INTERNAL MEDICINE
Payer: MEDICARE

## 2019-08-26 VITALS
HEART RATE: 68 BPM | BODY MASS INDEX: 28.35 KG/M2 | DIASTOLIC BLOOD PRESSURE: 58 MMHG | WEIGHT: 160 LBS | OXYGEN SATURATION: 93 % | HEIGHT: 63 IN | SYSTOLIC BLOOD PRESSURE: 92 MMHG | RESPIRATION RATE: 16 BRPM

## 2019-08-26 DIAGNOSIS — G81.91 RIGHT HEMIPARESIS: ICD-10-CM

## 2019-08-26 DIAGNOSIS — E78.5 HYPERLIPIDEMIA, UNSPECIFIED HYPERLIPIDEMIA TYPE: ICD-10-CM

## 2019-08-26 DIAGNOSIS — I63.312 CEREBRAL INFARCTION DUE TO THROMBOSIS OF LEFT MIDDLE CEREBRAL ARTERY: ICD-10-CM

## 2019-08-26 DIAGNOSIS — I10 ESSENTIAL HYPERTENSION: ICD-10-CM

## 2019-08-26 DIAGNOSIS — I48.21 PERMANENT ATRIAL FIBRILLATION: ICD-10-CM

## 2019-08-26 DIAGNOSIS — K21.9 GASTROESOPHAGEAL REFLUX DISEASE WITHOUT ESOPHAGITIS: ICD-10-CM

## 2019-08-26 DIAGNOSIS — R79.89 ELEVATED BRAIN NATRIURETIC PEPTIDE (BNP) LEVEL: ICD-10-CM

## 2019-08-26 DIAGNOSIS — I10 ESSENTIAL HYPERTENSION: Primary | ICD-10-CM

## 2019-08-26 DIAGNOSIS — I50.32 CHRONIC DIASTOLIC CHF (CONGESTIVE HEART FAILURE): ICD-10-CM

## 2019-08-26 LAB
ALBUMIN SERPL BCP-MCNC: 3.4 G/DL (ref 3.5–5.2)
ALP SERPL-CCNC: 60 U/L (ref 55–135)
ALT SERPL W/O P-5'-P-CCNC: 11 U/L (ref 10–44)
ANION GAP SERPL CALC-SCNC: 9 MMOL/L (ref 8–16)
AST SERPL-CCNC: 16 U/L (ref 10–40)
BASOPHILS # BLD AUTO: 0.01 K/UL (ref 0–0.2)
BASOPHILS NFR BLD: 0.2 % (ref 0–1.9)
BILIRUB SERPL-MCNC: 0.7 MG/DL (ref 0.1–1)
BUN SERPL-MCNC: 19 MG/DL (ref 8–23)
CALCIUM SERPL-MCNC: 9.9 MG/DL (ref 8.7–10.5)
CHLORIDE SERPL-SCNC: 102 MMOL/L (ref 95–110)
CHOLEST SERPL-MCNC: 86 MG/DL (ref 120–199)
CHOLEST/HDLC SERPL: 1.9 {RATIO} (ref 2–5)
CO2 SERPL-SCNC: 30 MMOL/L (ref 23–29)
CREAT SERPL-MCNC: 1 MG/DL (ref 0.5–1.4)
DIFFERENTIAL METHOD: ABNORMAL
EOSINOPHIL # BLD AUTO: 0.1 K/UL (ref 0–0.5)
EOSINOPHIL NFR BLD: 1.5 % (ref 0–8)
ERYTHROCYTE [DISTWIDTH] IN BLOOD BY AUTOMATED COUNT: 14 % (ref 11.5–14.5)
EST. GFR  (AFRICAN AMERICAN): 57 ML/MIN/1.73 M^2
EST. GFR  (NON AFRICAN AMERICAN): 50 ML/MIN/1.73 M^2
GLUCOSE SERPL-MCNC: 103 MG/DL (ref 70–110)
HCT VFR BLD AUTO: 36.3 % (ref 37–48.5)
HDLC SERPL-MCNC: 45 MG/DL (ref 40–75)
HDLC SERPL: 52.3 % (ref 20–50)
HGB BLD-MCNC: 11.4 G/DL (ref 12–16)
IMM GRANULOCYTES # BLD AUTO: 0.03 K/UL (ref 0–0.04)
IMM GRANULOCYTES NFR BLD AUTO: 0.5 % (ref 0–0.5)
LDLC SERPL CALC-MCNC: 32.4 MG/DL (ref 63–159)
LYMPHOCYTES # BLD AUTO: 1.2 K/UL (ref 1–4.8)
LYMPHOCYTES NFR BLD: 19.2 % (ref 18–48)
MCH RBC QN AUTO: 30 PG (ref 27–31)
MCHC RBC AUTO-ENTMCNC: 31.4 G/DL (ref 32–36)
MCV RBC AUTO: 96 FL (ref 82–98)
MONOCYTES # BLD AUTO: 0.5 K/UL (ref 0.3–1)
MONOCYTES NFR BLD: 7.9 % (ref 4–15)
NEUTROPHILS # BLD AUTO: 4.3 K/UL (ref 1.8–7.7)
NEUTROPHILS NFR BLD: 70.7 % (ref 38–73)
NONHDLC SERPL-MCNC: 41 MG/DL
NRBC BLD-RTO: 0 /100 WBC
PLATELET # BLD AUTO: 169 K/UL (ref 150–350)
PMV BLD AUTO: 10.2 FL (ref 9.2–12.9)
POTASSIUM SERPL-SCNC: 4.4 MMOL/L (ref 3.5–5.1)
PROT SERPL-MCNC: 6.3 G/DL (ref 6–8.4)
RBC # BLD AUTO: 3.8 M/UL (ref 4–5.4)
SODIUM SERPL-SCNC: 141 MMOL/L (ref 136–145)
TRIGL SERPL-MCNC: 43 MG/DL (ref 30–150)
TSH SERPL DL<=0.005 MIU/L-ACNC: 2.78 UIU/ML (ref 0.4–4)
WBC # BLD AUTO: 6.04 K/UL (ref 3.9–12.7)

## 2019-08-26 PROCEDURE — 99999 PR PBB SHADOW E&M-EST. PATIENT-LVL III: CPT | Mod: PBBFAC,,, | Performed by: INTERNAL MEDICINE

## 2019-08-26 PROCEDURE — 99214 OFFICE O/P EST MOD 30 MIN: CPT | Mod: S$PBB | Performed by: INTERNAL MEDICINE

## 2019-08-26 PROCEDURE — 80061 LIPID PANEL: CPT

## 2019-08-26 PROCEDURE — 99213 OFFICE O/P EST LOW 20 MIN: CPT | Mod: PBBFAC | Performed by: INTERNAL MEDICINE

## 2019-08-26 PROCEDURE — 80053 COMPREHEN METABOLIC PANEL: CPT

## 2019-08-26 PROCEDURE — 85025 COMPLETE CBC W/AUTO DIFF WBC: CPT

## 2019-08-26 PROCEDURE — 99999 PR PBB SHADOW E&M-EST. PATIENT-LVL III: ICD-10-PCS | Mod: PBBFAC,,, | Performed by: INTERNAL MEDICINE

## 2019-08-26 PROCEDURE — 36415 COLL VENOUS BLD VENIPUNCTURE: CPT

## 2019-08-26 PROCEDURE — 84443 ASSAY THYROID STIM HORMONE: CPT

## 2019-08-26 PROCEDURE — 99999 PR STA SHADOW: CPT | Mod: PBBFAC,,, | Performed by: INTERNAL MEDICINE

## 2019-08-26 RX ORDER — ATORVASTATIN CALCIUM 40 MG/1
TABLET, FILM COATED ORAL
Qty: 90 TABLET | Refills: 4 | Status: SHIPPED | OUTPATIENT
Start: 2019-08-26

## 2019-08-26 RX ORDER — SPIRONOLACTONE 25 MG/1
TABLET ORAL
Qty: 90 TABLET | Refills: 4 | Status: SHIPPED | OUTPATIENT
Start: 2019-08-26

## 2019-08-26 RX ORDER — CARVEDILOL 12.5 MG/1
TABLET ORAL
Qty: 180 TABLET | Refills: 4 | Status: SHIPPED | OUTPATIENT
Start: 2019-08-26

## 2019-08-26 NOTE — PROGRESS NOTES
Subjective:       Patient ID: Mattie Belle is a 89 y.o. female.    Chief Complaint: Follow-up (cva); Hypertension (gerd); Hyperlipidemia (chf); and Atrial Fibrillation    Mattie Belle is a 89  y.o. female who presents for ch anemia, CHF ,  Hypertension, and Hyperlipidemia follow up. Labs were reviewed with patient today.  Anemia highlighted with patient and daughter .     Hyperlipidemia   This is a chronic problem. The problem is controlled. Recent lipid tests were reviewed and are low. She has no history of obesity. Pertinent negatives include no chest pain, myalgias or shortness of breath.   Hypertension   This is a chronic problem. The current episode started more than 1 year ago. The problem is unchanged. The problem is controlled. Pertinent negatives include no blurred vision, chest pain, headaches, orthopnea, palpitations, PND or shortness of breath. Past treatments include beta blockers. The current treatment provides moderate improvement. Compliance problems include diet.    Gastroesophageal Reflux   She reports no abdominal pain, no chest pain, no choking, no coughing, no nausea, no sore throat or no wheezing. Pertinent negatives include no fatigue.   Medication Refill   Associated symptoms include weakness. Pertinent negatives include no abdominal pain, arthralgias, chest pain, chills, congestion, coughing, diaphoresis, fatigue, fever, headaches, myalgias, nausea, numbness, sore throat or vomiting.     Review of Systems   Constitutional: Negative for chills, diaphoresis, fatigue and fever.   HENT: Negative for congestion and sore throat.    Eyes: Negative for blurred vision.   Respiratory: Negative for cough, choking, shortness of breath and wheezing.    Cardiovascular: Negative for chest pain, palpitations, orthopnea and PND.   Gastrointestinal: Negative for abdominal distention, abdominal pain, constipation, diarrhea, nausea and vomiting.   Musculoskeletal: Negative for arthralgias, back pain and  myalgias.   Neurological: Positive for weakness. Negative for numbness and headaches.   Psychiatric/Behavioral: Negative for sleep disturbance.       Objective:      Physical Exam   Constitutional: She is oriented to person, place, and time. She appears well-developed and well-nourished.   HENT:   Head: Normocephalic and atraumatic.   Cardiovascular: Normal rate, regular rhythm and normal heart sounds.   Pulmonary/Chest: Effort normal and breath sounds normal.   Musculoskeletal: She exhibits no edema.   No passive rom to right shoulder. i can barely get any airspace in between her right arm and her lateral side/breast.    Neurological: She is alert and oriented to person, place, and time.   Skin: Skin is warm and dry.   Psychiatric: She has a normal mood and affect. Her behavior is normal. Judgment and thought content normal.   Nursing note and vitals reviewed.      Assessment:       1. Essential hypertension    2. Hyperlipidemia, unspecified hyperlipidemia type    3. Permanent atrial fibrillation    4. Right hemiparesis    5. Chronic diastolic CHF (congestive heart failure)        Plan:   Mattie was seen today for follow-up, hypertension, hyperlipidemia and atrial fibrillation.    Diagnoses and all orders for this visit:    Essential hypertension  -     CBC auto differential; Future  -     Comprehensive metabolic panel; Future  -     TSH; Future    Well controlled.  Continue same medication and dose.  1. Keep weight close to ideal body weight.   2.   Avoid high salt foods (olives, pickles, smoked meats, salted potato chips, etc.).   Do not add salt to your food at the table.   Use only small amounts of salt when cooking.   3. Begin an exercise program. Discuss with your doctor what type of exercise program would be best for you. It doesn't have to be difficult. Even brisk walking for 20 minutes three times a week is a good form of exercise.   4. Avoid medicines which contain heart stimulants. This includes many cold  and sinus decongestant pills and sprays as well as diet pills. Check the warnings about hypertension on the label. Stimulants such as amphetamine or cocaine could be lethal for someone with hypertension. Never take these.    Hyperlipidemia, unspecified hyperlipidemia type  -     Lipid panel; Future  -     TSH; Future  The current medical regimen is effective;  continue present plan and medications.  Permanent atrial fibrillation  The current medical regimen is effective;  continue present plan and medications.  Right hemiparesis  Stable.    Chronic diastolic CHF (congestive heart failure)  -     Brain natriuretic peptide; Future  We discussed about leg edema  And  weight gain issues with CHF.  I educated patient to call  If he  Gains 5 lbs and more ,or legs start swelling or worsening shortness of breath , or inability to lie down.  If breathing is worse go to emergency Room    Problem List Items Addressed This Visit     Hyperlipidemia    Relevant Orders    Lipid panel    TSH    Hypertension - Primary    Relevant Orders    CBC auto differential    Comprehensive metabolic panel    TSH    Right hemiparesis    Permanent atrial fibrillation    Chronic diastolic CHF (congestive heart failure)    Relevant Orders    Brain natriuretic peptide

## 2019-09-06 ENCOUNTER — HOSPITAL ENCOUNTER (INPATIENT)
Facility: HOSPITAL | Age: 84
LOS: 3 days | Discharge: HOSPICE | DRG: 280 | End: 2019-09-09
Attending: FAMILY MEDICINE | Admitting: FAMILY MEDICINE
Payer: MEDICARE

## 2019-09-06 ENCOUNTER — HOSPITAL ENCOUNTER (EMERGENCY)
Facility: HOSPITAL | Age: 84
Discharge: ANOTHER HOSPITAL | End: 2019-09-06
Attending: SURGERY
Payer: MEDICARE

## 2019-09-06 ENCOUNTER — PATIENT MESSAGE (OUTPATIENT)
Dept: INTERNAL MEDICINE | Facility: CLINIC | Age: 84
End: 2019-09-06

## 2019-09-06 VITALS
RESPIRATION RATE: 22 BRPM | DIASTOLIC BLOOD PRESSURE: 51 MMHG | WEIGHT: 147 LBS | OXYGEN SATURATION: 99 % | TEMPERATURE: 96 F | BODY MASS INDEX: 28.86 KG/M2 | HEART RATE: 77 BPM | SYSTOLIC BLOOD PRESSURE: 105 MMHG | HEIGHT: 60 IN

## 2019-09-06 DIAGNOSIS — Z45.010 ENCOUNTER FOR PACEMAKER AT END OF BATTERY LIFE: ICD-10-CM

## 2019-09-06 DIAGNOSIS — I20.0 UNSTABLE ANGINA: ICD-10-CM

## 2019-09-06 DIAGNOSIS — J18.9 PNEUMONIA DUE TO INFECTIOUS ORGANISM, UNSPECIFIED LATERALITY, UNSPECIFIED PART OF LUNG: ICD-10-CM

## 2019-09-06 DIAGNOSIS — I21.4 NSTEMI (NON-ST ELEVATED MYOCARDIAL INFARCTION): ICD-10-CM

## 2019-09-06 DIAGNOSIS — I21.4 NSTEMI (NON-ST ELEVATED MYOCARDIAL INFARCTION): Primary | ICD-10-CM

## 2019-09-06 DIAGNOSIS — G81.91 RIGHT HEMIPARESIS: ICD-10-CM

## 2019-09-06 DIAGNOSIS — R11.0 NAUSEA: Primary | ICD-10-CM

## 2019-09-06 LAB
ALBUMIN SERPL BCP-MCNC: 3.3 G/DL (ref 3.5–5.2)
ALP SERPL-CCNC: 88 U/L (ref 55–135)
ALT SERPL W/O P-5'-P-CCNC: 16 U/L (ref 10–44)
ANION GAP SERPL CALC-SCNC: 10 MMOL/L (ref 8–16)
APTT BLDCRRT: 31.4 SEC (ref 21–32)
APTT BLDCRRT: 69.2 SEC (ref 21–32)
AST SERPL-CCNC: 25 U/L (ref 10–40)
BACTERIA #/AREA URNS HPF: ABNORMAL /HPF
BASOPHILS # BLD AUTO: 0.01 K/UL (ref 0–0.2)
BASOPHILS NFR BLD: 0.1 % (ref 0–1.9)
BILIRUB SERPL-MCNC: 0.9 MG/DL (ref 0.1–1)
BILIRUB UR QL STRIP: ABNORMAL
BNP SERPL-MCNC: 672 PG/ML (ref 0–99)
BUN SERPL-MCNC: 33 MG/DL (ref 8–23)
CALCIUM SERPL-MCNC: 10.3 MG/DL (ref 8.7–10.5)
CHLORIDE SERPL-SCNC: 100 MMOL/L (ref 95–110)
CK MB SERPL-MCNC: 2.6 NG/ML (ref 0.1–6.5)
CK MB SERPL-RTO: 4.4 % (ref 0–5)
CK SERPL-CCNC: 59 U/L (ref 20–180)
CK SERPL-CCNC: 59 U/L (ref 20–180)
CLARITY UR: ABNORMAL
CO2 SERPL-SCNC: 30 MMOL/L (ref 23–29)
COLOR UR: YELLOW
CREAT SERPL-MCNC: 1.3 MG/DL (ref 0.5–1.4)
D DIMER PPP IA.FEU-MCNC: 0.83 MG/L FEU
DIFFERENTIAL METHOD: ABNORMAL
EOSINOPHIL # BLD AUTO: 0 K/UL (ref 0–0.5)
EOSINOPHIL NFR BLD: 0.1 % (ref 0–8)
ERYTHROCYTE [DISTWIDTH] IN BLOOD BY AUTOMATED COUNT: 13.7 % (ref 11.5–14.5)
EST. GFR  (AFRICAN AMERICAN): 42 ML/MIN/1.73 M^2
EST. GFR  (NON AFRICAN AMERICAN): 36 ML/MIN/1.73 M^2
GLUCOSE SERPL-MCNC: 110 MG/DL (ref 70–110)
GLUCOSE UR QL STRIP: NEGATIVE
HCT VFR BLD AUTO: 37.8 % (ref 37–48.5)
HGB BLD-MCNC: 11.7 G/DL (ref 12–16)
HGB UR QL STRIP: ABNORMAL
HYALINE CASTS #/AREA URNS LPF: 0 /LPF
IMM GRANULOCYTES # BLD AUTO: 0.06 K/UL (ref 0–0.04)
IMM GRANULOCYTES NFR BLD AUTO: 0.7 % (ref 0–0.5)
INR PPP: 1.1 (ref 0.8–1.2)
INR PPP: 1.2 (ref 0.8–1.2)
KETONES UR QL STRIP: NEGATIVE
LACTATE SERPL-SCNC: 0.8 MMOL/L (ref 0.5–2.2)
LEUKOCYTE ESTERASE UR QL STRIP: NEGATIVE
LYMPHOCYTES # BLD AUTO: 0.7 K/UL (ref 1–4.8)
LYMPHOCYTES NFR BLD: 7.7 % (ref 18–48)
MAGNESIUM SERPL-MCNC: 1.9 MG/DL (ref 1.6–2.6)
MAGNESIUM SERPL-MCNC: 1.9 MG/DL (ref 1.6–2.6)
MCH RBC QN AUTO: 29.3 PG (ref 27–31)
MCHC RBC AUTO-ENTMCNC: 31 G/DL (ref 32–36)
MCV RBC AUTO: 95 FL (ref 82–98)
MICROSCOPIC COMMENT: ABNORMAL
MONOCYTES # BLD AUTO: 1 K/UL (ref 0.3–1)
MONOCYTES NFR BLD: 11.6 % (ref 4–15)
NEUTROPHILS # BLD AUTO: 6.9 K/UL (ref 1.8–7.7)
NEUTROPHILS NFR BLD: 79.8 % (ref 38–73)
NITRITE UR QL STRIP: NEGATIVE
NRBC BLD-RTO: 0 /100 WBC
PH UR STRIP: 6 [PH] (ref 5–8)
PHOSPHATE SERPL-MCNC: 3.9 MG/DL (ref 2.7–4.5)
PHOSPHATE SERPL-MCNC: 4.7 MG/DL (ref 2.7–4.5)
PLATELET # BLD AUTO: 222 K/UL (ref 150–350)
PMV BLD AUTO: 10.5 FL (ref 9.2–12.9)
POCT GLUCOSE: 118 MG/DL (ref 70–110)
POTASSIUM SERPL-SCNC: 4.4 MMOL/L (ref 3.5–5.1)
PROCALCITONIN SERPL IA-MCNC: 0.19 NG/ML
PROT SERPL-MCNC: 7.4 G/DL (ref 6–8.4)
PROT UR QL STRIP: ABNORMAL
PROTHROMBIN TIME: 11.5 SEC (ref 9–12.5)
PROTHROMBIN TIME: 12 SEC (ref 9–12.5)
RBC # BLD AUTO: 3.99 M/UL (ref 4–5.4)
RBC #/AREA URNS HPF: 8 /HPF (ref 0–4)
SODIUM SERPL-SCNC: 140 MMOL/L (ref 136–145)
SP GR UR STRIP: >=1.03 (ref 1–1.03)
SQUAMOUS #/AREA URNS HPF: 3 /HPF
TROPONIN I SERPL DL<=0.01 NG/ML-MCNC: 0.03 NG/ML (ref 0–0.03)
TROPONIN I SERPL DL<=0.01 NG/ML-MCNC: 0.04 NG/ML (ref 0–0.03)
URN SPEC COLLECT METH UR: ABNORMAL
UROBILINOGEN UR STRIP-ACNC: ABNORMAL EU/DL
WBC # BLD AUTO: 8.6 K/UL (ref 3.9–12.7)
WBC #/AREA URNS HPF: 4 /HPF (ref 0–5)

## 2019-09-06 PROCEDURE — 93005 ELECTROCARDIOGRAM TRACING: CPT

## 2019-09-06 PROCEDURE — 80053 COMPREHEN METABOLIC PANEL: CPT

## 2019-09-06 PROCEDURE — 83735 ASSAY OF MAGNESIUM: CPT

## 2019-09-06 PROCEDURE — 85379 FIBRIN DEGRADATION QUANT: CPT

## 2019-09-06 PROCEDURE — 85730 THROMBOPLASTIN TIME PARTIAL: CPT

## 2019-09-06 PROCEDURE — 96361 HYDRATE IV INFUSION ADD-ON: CPT

## 2019-09-06 PROCEDURE — 63600175 PHARM REV CODE 636 W HCPCS: Performed by: SURGERY

## 2019-09-06 PROCEDURE — 85730 THROMBOPLASTIN TIME PARTIAL: CPT | Mod: 91

## 2019-09-06 PROCEDURE — 85610 PROTHROMBIN TIME: CPT | Mod: 91

## 2019-09-06 PROCEDURE — 84100 ASSAY OF PHOSPHORUS: CPT | Mod: 91

## 2019-09-06 PROCEDURE — 81000 URINALYSIS NONAUTO W/SCOPE: CPT

## 2019-09-06 PROCEDURE — 27000221 HC OXYGEN, UP TO 24 HOURS

## 2019-09-06 PROCEDURE — 84100 ASSAY OF PHOSPHORUS: CPT

## 2019-09-06 PROCEDURE — 94640 AIRWAY INHALATION TREATMENT: CPT

## 2019-09-06 PROCEDURE — 84145 PROCALCITONIN (PCT): CPT

## 2019-09-06 PROCEDURE — 25000003 PHARM REV CODE 250: Performed by: SURGERY

## 2019-09-06 PROCEDURE — 85610 PROTHROMBIN TIME: CPT

## 2019-09-06 PROCEDURE — 25000003 PHARM REV CODE 250: Performed by: STUDENT IN AN ORGANIZED HEALTH CARE EDUCATION/TRAINING PROGRAM

## 2019-09-06 PROCEDURE — 93010 EKG 12-LEAD: ICD-10-PCS | Mod: ,,, | Performed by: INTERNAL MEDICINE

## 2019-09-06 PROCEDURE — 83880 ASSAY OF NATRIURETIC PEPTIDE: CPT

## 2019-09-06 PROCEDURE — 83735 ASSAY OF MAGNESIUM: CPT | Mod: 91

## 2019-09-06 PROCEDURE — 63600175 PHARM REV CODE 636 W HCPCS: Performed by: STUDENT IN AN ORGANIZED HEALTH CARE EDUCATION/TRAINING PROGRAM

## 2019-09-06 PROCEDURE — 96365 THER/PROPH/DIAG IV INF INIT: CPT

## 2019-09-06 PROCEDURE — 11000001 HC ACUTE MED/SURG PRIVATE ROOM

## 2019-09-06 PROCEDURE — 84484 ASSAY OF TROPONIN QUANT: CPT

## 2019-09-06 PROCEDURE — 82550 ASSAY OF CK (CPK): CPT

## 2019-09-06 PROCEDURE — 84484 ASSAY OF TROPONIN QUANT: CPT | Mod: 91

## 2019-09-06 PROCEDURE — 82553 CREATINE MB FRACTION: CPT

## 2019-09-06 PROCEDURE — 36415 COLL VENOUS BLD VENIPUNCTURE: CPT

## 2019-09-06 PROCEDURE — S5010 5% DEXTROSE AND 0.45% SALINE: HCPCS | Performed by: STUDENT IN AN ORGANIZED HEALTH CARE EDUCATION/TRAINING PROGRAM

## 2019-09-06 PROCEDURE — 87040 BLOOD CULTURE FOR BACTERIA: CPT

## 2019-09-06 PROCEDURE — 99291 CRITICAL CARE FIRST HOUR: CPT | Mod: 25

## 2019-09-06 PROCEDURE — 94761 N-INVAS EAR/PLS OXIMETRY MLT: CPT

## 2019-09-06 PROCEDURE — 85025 COMPLETE CBC W/AUTO DIFF WBC: CPT

## 2019-09-06 PROCEDURE — 96375 TX/PRO/DX INJ NEW DRUG ADDON: CPT

## 2019-09-06 PROCEDURE — 83605 ASSAY OF LACTIC ACID: CPT

## 2019-09-06 PROCEDURE — 93010 ELECTROCARDIOGRAM REPORT: CPT | Mod: ,,, | Performed by: INTERNAL MEDICINE

## 2019-09-06 PROCEDURE — 25000242 PHARM REV CODE 250 ALT 637 W/ HCPCS: Performed by: SURGERY

## 2019-09-06 RX ORDER — IBUPROFEN 200 MG
16 TABLET ORAL
Status: DISCONTINUED | OUTPATIENT
Start: 2019-09-06 | End: 2019-09-09 | Stop reason: HOSPADM

## 2019-09-06 RX ORDER — SODIUM CHLORIDE 0.9 % (FLUSH) 0.9 %
5 SYRINGE (ML) INJECTION
Status: DISCONTINUED | OUTPATIENT
Start: 2019-09-06 | End: 2019-09-09 | Stop reason: HOSPADM

## 2019-09-06 RX ORDER — MORPHINE SULFATE 4 MG/ML
2 INJECTION, SOLUTION INTRAMUSCULAR; INTRAVENOUS EVERY 4 HOURS PRN
Status: DISCONTINUED | OUTPATIENT
Start: 2019-09-06 | End: 2019-09-09 | Stop reason: HOSPADM

## 2019-09-06 RX ORDER — GLUCAGON 1 MG
1 KIT INJECTION
Status: DISCONTINUED | OUTPATIENT
Start: 2019-09-06 | End: 2019-09-09 | Stop reason: HOSPADM

## 2019-09-06 RX ORDER — IPRATROPIUM BROMIDE AND ALBUTEROL SULFATE 2.5; .5 MG/3ML; MG/3ML
3 SOLUTION RESPIRATORY (INHALATION)
Status: COMPLETED | OUTPATIENT
Start: 2019-09-06 | End: 2019-09-06

## 2019-09-06 RX ORDER — HEPARIN SODIUM,PORCINE/D5W 25000/250
12 INTRAVENOUS SOLUTION INTRAVENOUS CONTINUOUS
Status: DISCONTINUED | OUTPATIENT
Start: 2019-09-06 | End: 2019-09-06 | Stop reason: HOSPADM

## 2019-09-06 RX ORDER — FERROUS SULFATE 325(65) MG
325 TABLET, DELAYED RELEASE (ENTERIC COATED) ORAL 2 TIMES DAILY
COMMUNITY

## 2019-09-06 RX ORDER — HEPARIN SODIUM,PORCINE/D5W 25000/250
12 INTRAVENOUS SOLUTION INTRAVENOUS CONTINUOUS
Status: DISCONTINUED | OUTPATIENT
Start: 2019-09-06 | End: 2019-09-07

## 2019-09-06 RX ORDER — ACETAMINOPHEN 325 MG/1
650 TABLET ORAL EVERY 8 HOURS PRN
Status: DISCONTINUED | OUTPATIENT
Start: 2019-09-06 | End: 2019-09-09 | Stop reason: HOSPADM

## 2019-09-06 RX ORDER — ONDANSETRON 4 MG/1
4 TABLET, ORALLY DISINTEGRATING ORAL EVERY 8 HOURS PRN
COMMUNITY

## 2019-09-06 RX ORDER — PANTOPRAZOLE SODIUM 40 MG/1
40 TABLET, DELAYED RELEASE ORAL
Status: COMPLETED | OUTPATIENT
Start: 2019-09-06 | End: 2019-09-06

## 2019-09-06 RX ORDER — IBUPROFEN 200 MG
24 TABLET ORAL
Status: DISCONTINUED | OUTPATIENT
Start: 2019-09-06 | End: 2019-09-09 | Stop reason: HOSPADM

## 2019-09-06 RX ORDER — ACETAMINOPHEN 325 MG/1
650 TABLET ORAL EVERY 4 HOURS PRN
Status: DISCONTINUED | OUTPATIENT
Start: 2019-09-06 | End: 2019-09-09 | Stop reason: HOSPADM

## 2019-09-06 RX ORDER — CLOPIDOGREL BISULFATE 75 MG/1
75 TABLET ORAL DAILY
Status: DISCONTINUED | OUTPATIENT
Start: 2019-09-07 | End: 2019-09-09 | Stop reason: HOSPADM

## 2019-09-06 RX ORDER — ONDANSETRON 8 MG/1
8 TABLET, ORALLY DISINTEGRATING ORAL EVERY 6 HOURS PRN
Status: DISCONTINUED | OUTPATIENT
Start: 2019-09-06 | End: 2019-09-09 | Stop reason: HOSPADM

## 2019-09-06 RX ORDER — ATORVASTATIN CALCIUM 40 MG/1
40 TABLET, FILM COATED ORAL DAILY
Status: DISCONTINUED | OUTPATIENT
Start: 2019-09-07 | End: 2019-09-09 | Stop reason: HOSPADM

## 2019-09-06 RX ORDER — ATORVASTATIN CALCIUM 40 MG/1
40 TABLET, FILM COATED ORAL
Status: COMPLETED | OUTPATIENT
Start: 2019-09-06 | End: 2019-09-06

## 2019-09-06 RX ORDER — DEXTROSE MONOHYDRATE AND SODIUM CHLORIDE 5; .45 G/100ML; G/100ML
INJECTION, SOLUTION INTRAVENOUS CONTINUOUS
Status: ACTIVE | OUTPATIENT
Start: 2019-09-06 | End: 2019-09-07

## 2019-09-06 RX ORDER — DOXYLAMINE SUCCINATE 25 MG
TABLET ORAL 2 TIMES DAILY
Status: DISCONTINUED | OUTPATIENT
Start: 2019-09-06 | End: 2019-09-06

## 2019-09-06 RX ORDER — ASPIRIN 325 MG
325 TABLET ORAL
Status: COMPLETED | OUTPATIENT
Start: 2019-09-06 | End: 2019-09-06

## 2019-09-06 RX ORDER — PANTOPRAZOLE SODIUM 40 MG/1
40 TABLET, DELAYED RELEASE ORAL DAILY
Status: DISCONTINUED | OUTPATIENT
Start: 2019-09-07 | End: 2019-09-09 | Stop reason: HOSPADM

## 2019-09-06 RX ADMIN — HEPARIN SODIUM AND DEXTROSE 12 UNITS/KG/HR: 10000; 5 INJECTION INTRAVENOUS at 05:09

## 2019-09-06 RX ADMIN — CEFTRIAXONE 1 G: 1 INJECTION, SOLUTION INTRAVENOUS at 05:09

## 2019-09-06 RX ADMIN — IPRATROPIUM BROMIDE AND ALBUTEROL SULFATE 3 ML: .5; 3 SOLUTION RESPIRATORY (INHALATION) at 05:09

## 2019-09-06 RX ADMIN — LIDOCAINE HYDROCHLORIDE 50 ML: 20 SOLUTION ORAL; TOPICAL at 04:09

## 2019-09-06 RX ADMIN — DEXTROSE 500 MG: 5 SOLUTION INTRAVENOUS at 05:09

## 2019-09-06 RX ADMIN — SODIUM CHLORIDE 500 ML: 0.9 INJECTION, SOLUTION INTRAVENOUS at 04:09

## 2019-09-06 RX ADMIN — SODIUM CHLORIDE 500 ML: 0.9 INJECTION, SOLUTION INTRAVENOUS at 09:09

## 2019-09-06 RX ADMIN — ASPIRIN 325 MG ORAL TABLET 325 MG: 325 PILL ORAL at 04:09

## 2019-09-06 RX ADMIN — ATORVASTATIN CALCIUM 40 MG: 40 TABLET, FILM COATED ORAL at 04:09

## 2019-09-06 RX ADMIN — NITROGLYCERIN 0.5 INCH: 20 OINTMENT TOPICAL at 04:09

## 2019-09-06 RX ADMIN — DEXTROSE AND SODIUM CHLORIDE: 5; .45 INJECTION, SOLUTION INTRAVENOUS at 11:09

## 2019-09-06 RX ADMIN — HEPARIN SODIUM AND DEXTROSE 12 UNITS/KG/HR: 10000; 5 INJECTION INTRAVENOUS at 11:09

## 2019-09-06 RX ADMIN — PANTOPRAZOLE SODIUM 40 MG: 40 TABLET, DELAYED RELEASE ORAL at 04:09

## 2019-09-06 NOTE — ED NOTES
Pt accepted to [facility], bed 426A, by Dr. Scott. Number for report is [phone]. Reported ambulance is already on the way.

## 2019-09-06 NOTE — PLAN OF CARE
(Physician in Lead of Transfers)   Outside Transfer Acceptance Note / [facility] Referral [facility]    Transferring Physician: Colin Lowe MD    Accepting Physician: Radha Velasco MD    Date of Acceptance: 09/06/2019    Transferring Facility: [facility]    Destination Facility and Admitting Physician: [facility], Dr Scott    Reason for Transfer: ACS, CAP. Needs cardiology.     Report from Transferring Physician/Hospital course: 89F with HTN, HLD, Afib, CHB s/p PPM, to ED with CP 5/10, dipahoretic, felt like elephant on chest, EKG no ST changes, trop elevated at 0.036, BUN/cr wnl, BNP elevated, Dr Bosch spoke with Chrissy and agrees to consult at [place]. Feels better with nitro paste.  ACS: Unstable angina, possible early NSTEMI.  Has R PNA on CXR, given Azithro and CTX.      Daughter is a retired RN ([employer])    Dr Lowe is starting a heparin gtt (for ACS) prior to transfer    VS: VSS, see Epic    Labs: see above    Radiographs: see above    To Do List: Admit to HM.     Upon patient arrival to floor, please contact Hospital Medicine on call.     Radha Velasco MD  Hospital Medicine Staff  Pager: [phone]  Cell: [phone]

## 2019-09-06 NOTE — ED PROVIDER NOTES
[facility]                                                 Chief Complaint  89 y.o. female with Chest Pain   -- medial chest pain x 2 days  -- vomited yesterday  -- decreased appetite    History of Present Illness  Mattie Belle presents to the emergency room with chest pain on again off again  Patient with on again off again chest pain last night, this morning, this afternoon  Patient presents with 5/10 chest pain, appears diaphoretic on initial evaluation  Patient has a long cardiac history including atrial fib and CHF & arterial disease  Daughter at bedside states the patient has no previous history of heart attack    The history is provided by the patient and daughter   device was not used during this ER visit  Medical history: Atrial fib, CHF, HLD, HTN, CVA  Surgeries: Gallbladder, hysterectomy, pacemaker  No known allergies    I have reviewed all of this patient's past medical, surgical, family, and social   histories as well as active allergies and medications documented in the  electronic medical record    Review of Systems and Physical Exam      Review of Systems  -- Constitution - no fever, denies fatigue, no weakness, no chills  -- Eyes - no tearing or redness, no visual disturbance  -- Ear, Nose - no tinnitus or earache, no nasal congestion or discharge  -- Mouth,Throat - no sore throat, no toothache, normal voice, normal swallowing  -- Respiratory - denies cough and congestion, no shortness of breath, no GABRIEL  -- Cardiovascular - chest pain, no palpitations, denies claudication  -- Gastrointestinal - nausea, vomiting, no abdominal pain or diarrhea  -- Genitourinary - no dysuria, denies flank pain, no hematuria, no STD risk  -- Musculoskeletal - denies back pain, negative for trauma or injury  -- Neurological - no headache, denies weakness or seizure; no LOC  -- Skin - denies pallor, rash, or changes in skin. no hives or welts noted  -- Psychiatric - Denies SI or  HI, no psychosis or fractured thought noted     Vital Signs  Her tympanic temperature is 96 °F (35.6 °C).   Her blood pressure is 131/63 and her pulse is 87.   Her respiration is 36 (abnormal) and oxygen saturation is 99%.     Physical Exam  -- Nursing note and vitals reviewed  -- Constitutional: Appears well-developed and well-nourished  -- Head: Atraumatic. Normocephalic. No obvious abnormality  -- Eyes: Pupils are equal and reactive to light. Normal conjunctiva and lids  -- Cardiac: Normal rate, regular rhythm and normal heart sounds  -- Pulmonary: Slightly diminished breath sounds at the right base  -- Abdominal: Soft, no tenderness. Normal bowel sounds. Normal liver edge  -- Musculoskeletal: Normal range of motion, no effusions. Joints stable   -- Neurological: No focal deficits. Showed good interaction with staff  -- Vascular: Posterior tibial, dorsalis pedis and radial pulses 2+ bilaterally      Emergency Room Course      Lab Results     K 4.4      CO2 30 (H)   BUN 33 (H)   CREATININE 1.3      ALKPHOS 88   AST 25   ALT 16   BILITOT 0.9   ALBUMIN 3.3 (L)   PROT 7.4   WBC 8.60   HGB 11.7 (L)   HCT 37.8      CPK 59   CPK 59   CPKMB 2.6   TROPONINI 0.036 (H)   INR 1.1    (H)   DDIMER 0.83 (H)   MG 1.9     EKG  -- atrial fibrillation at 90 beats per minute with unspecific T-wave abnormality    Chest x-ray  Interval development of right middle and lower lobe consolidation suspicious for pneumonia.  Follow-up recommended.     Medications Given  azithromycin 500 mg in dextrose 5 % 250 mL IVPB (ready to mix system) (has no administration in time range)   cefTRIAXone (ROCEPHIN) 1 g in dextrose 5 % 50 mL IVPB (has no administration in time range)   albuterol-ipratropium 2.5 mg-0.5 mg/3 mL nebulizer solution 3 mL (has no administration in time range)   heparin 25,000 units in dextrose 5% (100 units/ml) IV bolus from bag INITIAL BOLUS (max bolus 4000 units)   heparin 25,000 units in  dextrose 5% 250 mL (100 units/mL) infusion LOW INTENSITY nomogram - [facility]   heparin 25,000 units in dextrose 5% (100 units/ml) IV bolus from bag - ADDITIONAL PRN BOLUS - 60 units/kg (max bolus 4000 units)   heparin 25,000 units in dextrose 5% (100 units/ml) IV bolus from bag - ADDITIONAL PRN BOLUS - 30 units/kg (max bolus 4000 units)   aspirin tablet 325 mg (325 mg Oral Given 9/6/19 1603)   sodium chloride 0.9% bolus 500 mL (500 mLs Intravenous New Bag 9/6/19 1603)   atorvastatin tablet 40 mg (40 mg Oral Given 9/6/19 1641)   pantoprazole EC tablet 40 mg (40 mg Oral Given 9/6/19 1641)   nitroGLYCERIN 2% TD oint ointment 0.5 inch (0.5 inches Topical (Top) Given 9/6/19 1641)   GI cocktail (mylanta 30 mL, lidocaine 2 % viscous 10 mL, dicyclomine 10 mL) 50 mL (50 mLs Oral Given 9/6/19 1641)      Medical Decision Making     Initial Assessment  -- patient with ongoing chest pain for last 2 days, culminating today  -- diaphoretic, daughter states that the patient has an extensive heart history    Differential Diagnosis  -- angina, chest pain, heart attack, STEMI versus non STEMI, noncardiac CP    Testing  -- Independently Interpreted: I have ordered and independently interpreted x-rays & EKG  -- Clinical Tests: Lab tests/medical tests/radiology were ordered and reviewed  -- history was provided by the daughter as well as the patient  -- this patient was discussed with [facility]    ED Management  -- patient with chest pain, diaphoretic, not feeling well on presentation  -- slight elevation in troponin and BNP, consider NSTEMI versus angina  -- patient has no white count for chest x-ray also reveals a right-sided pneumonia  -- patient be treated with IV Rocephin and Zithromax a breathing treatment given  -- continued chest pain warrants cardiology evaluation, accepted by [facility]    Heart Pathway Score  -- History:  Moderately suspicious (1)  -- EKG:  Nonspecific (1)  -- Age:  Greater than 65 (2)  -- Risk factors: No  known risk factors (2)  -- Initial troponin: Normal (1)  -- Total score: (7) moderate to high risk    Critical Care ED Physician Time (minutes):  -- Performed by: Colin Lowe M.D.  -- Date/Time: 4:59 PM 9/6/2019   -- Direct Patient Care (Face Time):   5  -- Additional History from Records or Taking Additional History: 5  -- Ordering, Reviewing, and Interpreting Diagnostic Studies:   5  -- Total Time in Documentation: 5  -- Consultation with Other Physicians: 5  -- Consultation with Family Related to Condition: 5  -- Total Critical Care Time: 30    Assessment, Disposition, & Plan       Diagnosis  -- Nausea  -- Unstable angina  -- Pneumonia  -- NSTEMI    Disposition and Plan  -- Disposition: transfer  -- Condition: stable    This note is dictated on M*Modal word recognition program.  There are word recognition mistakes that are occasionally missed on review.                Colin Lowe MD  09/06/19 [id number]

## 2019-09-06 NOTE — ED TRIAGE NOTES
89 y.o. female presents to ER ED 06/ED 06   Chief Complaint   Patient presents with    Chest Pain     medial chest pain x2 days, vomited yesterday, decreased appetite,    . No acute distress noted.

## 2019-09-07 PROBLEM — Z66 DNR (DO NOT RESUSCITATE): Status: ACTIVE | Noted: 2019-09-07

## 2019-09-07 LAB
ALBUMIN SERPL BCP-MCNC: 2.6 G/DL (ref 3.5–5.2)
ALP SERPL-CCNC: 71 U/L (ref 55–135)
ALT SERPL W/O P-5'-P-CCNC: 14 U/L (ref 10–44)
ANION GAP SERPL CALC-SCNC: 7 MMOL/L (ref 8–16)
AORTIC ROOT ANNULUS: 3 CM
APTT BLDCRRT: 59.2 SEC (ref 21–32)
APTT BLDCRRT: 66.1 SEC (ref 21–32)
AST SERPL-CCNC: 24 U/L (ref 10–40)
AV INDEX (PROSTH): 0.53
AV MEAN GRADIENT: 4 MMHG
AV PEAK GRADIENT: 5 MMHG
AV VALVE AREA: 1.64 CM2
AV VELOCITY RATIO: 0.53
BASOPHILS # BLD AUTO: 0.02 K/UL (ref 0–0.2)
BASOPHILS # BLD AUTO: 0.02 K/UL (ref 0–0.2)
BASOPHILS NFR BLD: 0.2 % (ref 0–1.9)
BASOPHILS NFR BLD: 0.2 % (ref 0–1.9)
BILIRUB SERPL-MCNC: 0.5 MG/DL (ref 0.1–1)
BSA FOR ECHO PROCEDURE: 1.67 M2
BUN SERPL-MCNC: 36 MG/DL (ref 8–23)
CALCIUM SERPL-MCNC: 9.3 MG/DL (ref 8.7–10.5)
CHLORIDE SERPL-SCNC: 104 MMOL/L (ref 95–110)
CO2 SERPL-SCNC: 26 MMOL/L (ref 23–29)
CREAT SERPL-MCNC: 1.5 MG/DL (ref 0.5–1.4)
CV ECHO LV RWT: 0.49 CM
DIFFERENTIAL METHOD: ABNORMAL
DIFFERENTIAL METHOD: ABNORMAL
DOP CALC AO PEAK VEL: 1.14 M/S
DOP CALC AO VTI: 23.74 CM
DOP CALC LVOT AREA: 3.1 CM2
DOP CALC LVOT DIAMETER: 1.99 CM
DOP CALC LVOT PEAK VEL: 0.6 M/S
DOP CALC LVOT STROKE VOLUME: 38.95 CM3
DOP CALCLVOT PEAK VEL VTI: 12.53 CM
ECHO LV POSTERIOR WALL: 0.93 CM (ref 0.6–1.1)
EOSINOPHIL # BLD AUTO: 0 K/UL (ref 0–0.5)
EOSINOPHIL # BLD AUTO: 0 K/UL (ref 0–0.5)
EOSINOPHIL NFR BLD: 0.4 % (ref 0–8)
EOSINOPHIL NFR BLD: 0.4 % (ref 0–8)
ERYTHROCYTE [DISTWIDTH] IN BLOOD BY AUTOMATED COUNT: 14.4 % (ref 11.5–14.5)
ERYTHROCYTE [DISTWIDTH] IN BLOOD BY AUTOMATED COUNT: 14.4 % (ref 11.5–14.5)
EST. GFR  (AFRICAN AMERICAN): 35 ML/MIN/1.73 M^2
EST. GFR  (NON AFRICAN AMERICAN): 30 ML/MIN/1.73 M^2
FRACTIONAL SHORTENING: 53 % (ref 28–44)
GLUCOSE SERPL-MCNC: 127 MG/DL (ref 70–110)
GRAM STN SPEC: NORMAL
HCT VFR BLD AUTO: 37.2 % (ref 37–48.5)
HCT VFR BLD AUTO: 37.2 % (ref 37–48.5)
HGB BLD-MCNC: 11 G/DL (ref 12–16)
HGB BLD-MCNC: 11 G/DL (ref 12–16)
INTERVENTRICULAR SEPTUM: 0.88 CM (ref 0.6–1.1)
LA MAJOR: 6.21 CM
LA MINOR: 6.4 CM
LA WIDTH: 4.57 CM
LEFT ATRIUM SIZE: 5.1 CM
LEFT ATRIUM VOLUME INDEX: 75.3 ML/M2
LEFT ATRIUM VOLUME: 124.88 CM3
LEFT INTERNAL DIMENSION IN SYSTOLE: 1.8 CM (ref 2.1–4)
LEFT VENTRICLE DIASTOLIC VOLUME INDEX: 37.43 ML/M2
LEFT VENTRICLE DIASTOLIC VOLUME: 62.05 ML
LEFT VENTRICLE MASS INDEX: 61 G/M2
LEFT VENTRICLE SYSTOLIC VOLUME INDEX: 16 ML/M2
LEFT VENTRICLE SYSTOLIC VOLUME: 26.53 ML
LEFT VENTRICULAR INTERNAL DIMENSION IN DIASTOLE: 3.8 CM (ref 3.5–6)
LEFT VENTRICULAR MASS: 101.84 G
LYMPHOCYTES # BLD AUTO: 1.1 K/UL (ref 1–4.8)
LYMPHOCYTES # BLD AUTO: 1.1 K/UL (ref 1–4.8)
LYMPHOCYTES NFR BLD: 10.6 % (ref 18–48)
LYMPHOCYTES NFR BLD: 10.6 % (ref 18–48)
MAGNESIUM SERPL-MCNC: 1.8 MG/DL (ref 1.6–2.6)
MCH RBC QN AUTO: 29.4 PG (ref 27–31)
MCH RBC QN AUTO: 29.4 PG (ref 27–31)
MCHC RBC AUTO-ENTMCNC: 29.6 G/DL (ref 32–36)
MCHC RBC AUTO-ENTMCNC: 29.6 G/DL (ref 32–36)
MCV RBC AUTO: 100 FL (ref 82–98)
MCV RBC AUTO: 100 FL (ref 82–98)
MONOCYTES # BLD AUTO: 2.1 K/UL (ref 0.3–1)
MONOCYTES # BLD AUTO: 2.1 K/UL (ref 0.3–1)
MONOCYTES NFR BLD: 20.6 % (ref 4–15)
MONOCYTES NFR BLD: 20.6 % (ref 4–15)
NEUTROPHILS # BLD AUTO: 6.8 K/UL (ref 1.8–7.7)
NEUTROPHILS # BLD AUTO: 6.8 K/UL (ref 1.8–7.7)
NEUTROPHILS NFR BLD: 68.2 % (ref 38–73)
NEUTROPHILS NFR BLD: 68.2 % (ref 38–73)
PHOSPHATE SERPL-MCNC: 4.8 MG/DL (ref 2.7–4.5)
PISA TR MAX VEL: 3.19 M/S
PLATELET # BLD AUTO: 168 K/UL (ref 150–350)
PLATELET # BLD AUTO: 168 K/UL (ref 150–350)
PMV BLD AUTO: 10.4 FL (ref 9.2–12.9)
PMV BLD AUTO: 10.4 FL (ref 9.2–12.9)
POCT GLUCOSE: 134 MG/DL (ref 70–110)
POCT GLUCOSE: 143 MG/DL (ref 70–110)
POTASSIUM SERPL-SCNC: 4.6 MMOL/L (ref 3.5–5.1)
PROT SERPL-MCNC: 5.8 G/DL (ref 6–8.4)
PULM VEIN S/D RATIO: 0.69
PV PEAK D VEL: 0.32 M/S
PV PEAK S VEL: 0.22 M/S
RA MAJOR: 6.6 CM
RA PRESSURE: 3 MMHG
RA WIDTH: 5.4 CM
RBC # BLD AUTO: 3.74 M/UL (ref 4–5.4)
RBC # BLD AUTO: 3.74 M/UL (ref 4–5.4)
RIGHT VENTRICLE FREE WALL THICKNESS: 5
RIGHT VENTRICULAR END-DIASTOLIC DIMENSION: 2.9 CM
RIGHT VENTRICULAR LENGTH IN DIASTOLE (APICAL 4-CHAMBER VIEW): 1.8 CM
SODIUM SERPL-SCNC: 137 MMOL/L (ref 136–145)
TR MAX PG: 41 MMHG
TROPONIN I SERPL DL<=0.01 NG/ML-MCNC: 0.02 NG/ML (ref 0–0.03)
TROPONIN I SERPL DL<=0.01 NG/ML-MCNC: 0.02 NG/ML (ref 0–0.03)
TV REST PULMONARY ARTERY PRESSURE: 44 MMHG
WBC # BLD AUTO: 10.02 K/UL (ref 3.9–12.7)
WBC # BLD AUTO: 10.02 K/UL (ref 3.9–12.7)

## 2019-09-07 PROCEDURE — 11000001 HC ACUTE MED/SURG PRIVATE ROOM

## 2019-09-07 PROCEDURE — 36415 COLL VENOUS BLD VENIPUNCTURE: CPT

## 2019-09-07 PROCEDURE — 85730 THROMBOPLASTIN TIME PARTIAL: CPT

## 2019-09-07 PROCEDURE — 87186 SC STD MICRODIL/AGAR DIL: CPT

## 2019-09-07 PROCEDURE — 27000221 HC OXYGEN, UP TO 24 HOURS

## 2019-09-07 PROCEDURE — 85730 THROMBOPLASTIN TIME PARTIAL: CPT | Mod: 91

## 2019-09-07 PROCEDURE — 87077 CULTURE AEROBIC IDENTIFY: CPT

## 2019-09-07 PROCEDURE — 93005 ELECTROCARDIOGRAM TRACING: CPT

## 2019-09-07 PROCEDURE — 99223 1ST HOSP IP/OBS HIGH 75: CPT | Mod: 25,,, | Performed by: INTERNAL MEDICINE

## 2019-09-07 PROCEDURE — 25000003 PHARM REV CODE 250: Performed by: FAMILY MEDICINE

## 2019-09-07 PROCEDURE — 84100 ASSAY OF PHOSPHORUS: CPT

## 2019-09-07 PROCEDURE — 94761 N-INVAS EAR/PLS OXIMETRY MLT: CPT

## 2019-09-07 PROCEDURE — 99900037 HC PT THERAPY SCREENING (STAT)

## 2019-09-07 PROCEDURE — 25000003 PHARM REV CODE 250: Performed by: STUDENT IN AN ORGANIZED HEALTH CARE EDUCATION/TRAINING PROGRAM

## 2019-09-07 PROCEDURE — 80053 COMPREHEN METABOLIC PANEL: CPT

## 2019-09-07 PROCEDURE — 63600175 PHARM REV CODE 636 W HCPCS: Performed by: STUDENT IN AN ORGANIZED HEALTH CARE EDUCATION/TRAINING PROGRAM

## 2019-09-07 PROCEDURE — 85025 COMPLETE CBC W/AUTO DIFF WBC: CPT

## 2019-09-07 PROCEDURE — 87088 URINE BACTERIA CULTURE: CPT

## 2019-09-07 PROCEDURE — 99223 PR INITIAL HOSPITAL CARE,LEVL III: ICD-10-PCS | Mod: 25,,, | Performed by: INTERNAL MEDICINE

## 2019-09-07 PROCEDURE — 87205 SMEAR GRAM STAIN: CPT

## 2019-09-07 PROCEDURE — 84484 ASSAY OF TROPONIN QUANT: CPT

## 2019-09-07 PROCEDURE — 87086 URINE CULTURE/COLONY COUNT: CPT

## 2019-09-07 PROCEDURE — 83735 ASSAY OF MAGNESIUM: CPT

## 2019-09-07 RX ADMIN — CEFTRIAXONE SODIUM 1 G: 1 INJECTION, POWDER, FOR SOLUTION INTRAMUSCULAR; INTRAVENOUS at 04:09

## 2019-09-07 RX ADMIN — APIXABAN 2.5 MG: 2.5 TABLET, FILM COATED ORAL at 09:09

## 2019-09-07 RX ADMIN — AZITHROMYCIN MONOHYDRATE 500 MG: 500 INJECTION, POWDER, LYOPHILIZED, FOR SOLUTION INTRAVENOUS at 05:09

## 2019-09-07 RX ADMIN — PANTOPRAZOLE SODIUM 40 MG: 40 TABLET, DELAYED RELEASE ORAL at 09:09

## 2019-09-07 RX ADMIN — ATORVASTATIN CALCIUM 40 MG: 40 TABLET, FILM COATED ORAL at 09:09

## 2019-09-07 RX ADMIN — MICONAZOLE NITRATE: 2 OINTMENT TOPICAL at 09:09

## 2019-09-07 RX ADMIN — CEFTRIAXONE SODIUM 1 G: 1 INJECTION, POWDER, FOR SOLUTION INTRAMUSCULAR; INTRAVENOUS at 05:09

## 2019-09-07 RX ADMIN — CLOPIDOGREL BISULFATE 75 MG: 75 TABLET ORAL at 09:09

## 2019-09-07 RX ADMIN — LORAZEPAM 0.5 MG: 2 INJECTION INTRAMUSCULAR; INTRAVENOUS at 06:09

## 2019-09-07 NOTE — PLAN OF CARE
Problem: Adult Inpatient Plan of Care  Goal: Plan of Care Review  Outcome: Ongoing (interventions implemented as appropriate)  Admission completed.  Initiated care plan and education plan.  Chart review complete.

## 2019-09-07 NOTE — NURSING
MD at bedside trying to assess patient's pulse ox and speaking to the patient and daughter. O2 sat. 85% RA. No acute distress noted. Will continue to monitor.

## 2019-09-07 NOTE — CONSULTS
[facility]  Cardiology  Consult Note    Patient Name: Mattie Belle  MRN: [id number]  Admission Date: 9/6/2019  Hospital Length of Stay: 1 days  Code Status: DNR   Attending Provider: [facility]  Consulting Provider: Andre Bosch MD  Primary Care Physician: Yunior Pagan MD  Principal Problem:NSTEMI (non-ST elevated myocardial infarction)    Patient information was obtained from patient, daughter, and chart review .     Inpatient consult to [facility]  Consult performed by: Yemi Ochoa NP  Consult ordered by: Andrew Goel MD        Subjective:     Chief Complaint:  Chest pain     HPI:   89 year old WF DNR who here for evaluation of CP with abnormal TNI 0.036 which has trended down to normal. She has multiple medical problems and not a candidate for invasive cardiac procedures. Her daughter a retired RN agreed with the plan.       Echo      · Normal left ventricular systolic function. The estimated ejection fraction is 55%  · Left ventricular diastolic dysfunction.  · Normal right ventricular systolic function.  · Severe left atrial enlargement.  · Severe right atrial enlargement.  · Severe tricuspid regurgitation.  · Pulmonary hypertension present.  · Mild aortic regurgitation.  · Normal central venous pressure (3 mm Hg).  · The estimated PA systolic pressure is 44 mm Hg       ECG: V paced rhythm           Past Medical History:   Diagnosis Date    *Atrial fibrillation     Atrial fibrillation     CHF (congestive heart failure)     CHF and MI in April , 2015    Encounter for blood transfusion     Hyperlipidemia     Hypertension     Stroke        Past Surgical History:   Procedure Laterality Date    CHOLECYSTECTOMY      HYSTERECTOMY      INSERT / REPLACE / REMOVE PACEMAKER      REPLACEMENT, PACEMAKER GENERATOR N/A 8/20/2018    Performed by Dami Du MD at [facility] CATH LAB       Review of patient's allergies indicates:  No Known Allergies    Current  Facility-Administered Medications on File Prior to Encounter   Medication    [COMPLETED] albuterol-ipratropium 2.5 mg-0.5 mg/3 mL nebulizer solution 3 mL    [COMPLETED] aspirin tablet 325 mg    [COMPLETED] atorvastatin tablet 40 mg    [COMPLETED] azithromycin 500 mg in dextrose 5 % 250 mL IVPB (ready to mix system)    [COMPLETED] cefTRIAXone (ROCEPHIN) 1 g in dextrose 5 % 50 mL IVPB    [COMPLETED] GI cocktail (mylanta 30 mL, lidocaine 2 % viscous 10 mL, dicyclomine 10 mL) 50 mL    [COMPLETED] heparin 25,000 units in dextrose 5% (100 units/ml) IV bolus from bag INITIAL BOLUS (max bolus 4000 units)    [COMPLETED] nitroGLYCERIN 2% TD oint ointment 0.5 inch    [COMPLETED] pantoprazole EC tablet 40 mg    [COMPLETED] sodium chloride 0.9% bolus 500 mL    [DISCONTINUED] heparin 25,000 units in dextrose 5% (100 units/ml) IV bolus from bag - ADDITIONAL PRN BOLUS - 30 units/kg (max bolus 4000 units)    [DISCONTINUED] heparin 25,000 units in dextrose 5% (100 units/ml) IV bolus from bag - ADDITIONAL PRN BOLUS - 60 units/kg (max bolus 4000 units)    [DISCONTINUED] heparin 25,000 units in dextrose 5% 250 mL (100 units/mL) infusion LOW INTENSITY nomogram - [facility]     Current Outpatient Medications on File Prior to Encounter   Medication Sig    atorvastatin (LIPITOR) 40 MG tablet TAKE 1 TABLET DAILY    C,E,zinc,copper 11/[id number]/lut (OCUVITE ADULT 50 PLUS ORAL) Take by mouth every evening.    carvedilol (COREG) 12.5 MG tablet TAKE 1 TABLET TWICE A DAY    cholecalciferol, vitamin D3, (VITAMIN D3) 5,000 unit Tab Take 1 capsule by mouth every evening.     clopidogrel (PLAVIX) 75 mg tablet Take 1 tablet (75 mg total) by mouth once daily.    ELIQUIS 2.5 mg Tab TAKE 1 TABLET TWICE A DAY    ferrous sulfate 325 (65 FE) MG EC tablet Take 325 mg by mouth 2 (two) times daily.    nystatin (NYSTOP) powder APPLY TO AFFECTED AREA THREE TIMES A DAY    ondansetron (ZOFRAN-ODT) 4 MG TbDL Take 4 mg by mouth every 8 (eight) hours as  needed.    pantoprazole (PROTONIX) 40 MG tablet TAKE 1 TABLET DAILY    spironolactone (ALDACTONE) 25 MG tablet TAKE 1 TABLET DAILY (Patient taking differently: Take 1/2 tablet daily)    nitroGLYCERIN 0.4 MG/DOSE TL SPRY (NITROLINGUAL) 400 mcg/spray spray Place 1 spray under the tongue every 5 (five) minutes as needed for Chest pain (max 3 doses). Max 3 doses    nystatin (MYCOSTATIN) cream Apply topically 2 (two) times daily.    sodium bicarbonate/sod citrat (HOWARD-SELTZER HEARTBURN ORAL) Take by mouth as needed.     Family History     Problem Relation (Age of Onset)    Heart disease Father    Hypertension Father        Tobacco Use    Smoking status: Never Smoker    Smokeless tobacco: Never Used   Substance and Sexual Activity    Alcohol use: No    Drug use: No    Sexual activity: Not on file     Review of Systems   Constitution: Positive for malaise/fatigue. Negative for diaphoresis, night sweats, weight gain and weight loss.   HENT: Negative for congestion.    Eyes: Negative for blurred vision, discharge and double vision.   Cardiovascular: Positive for chest pain. Negative for claudication, cyanosis, dyspnea on exertion, irregular heartbeat, leg swelling, near-syncope, orthopnea, palpitations, paroxysmal nocturnal dyspnea and syncope.   Respiratory: Negative for cough, shortness of breath and wheezing.    Endocrine: Negative for cold intolerance, heat intolerance and polyphagia.   Hematologic/Lymphatic: Negative for adenopathy and bleeding problem. Does not bruise/bleed easily.   Skin: Negative for dry skin and nail changes.   Musculoskeletal: Negative for arthritis, back pain, falls, joint pain, myalgias and neck pain.   Gastrointestinal: Negative for bloating, abdominal pain, change in bowel habit and constipation.   Genitourinary: Negative for bladder incontinence, dysuria, flank pain, genital sores and missed menses.   Neurological: Negative for aphonia, brief paralysis, difficulty with concentration,  dizziness and weakness.        Chronic R sided weakness   Psychiatric/Behavioral: Negative for altered mental status and memory loss. The patient does not have insomnia.    Allergic/Immunologic: Negative for environmental allergies.     Objective:     Vital Signs (Most Recent):  Temp: 97.6 °F (36.4 °C) (09/07/19 1243)  Pulse: 85 (09/07/19 1243)  Resp: 18 (09/07/19 1243)  BP: (!) 100/57 (09/07/19 1243)  SpO2: 96 % (09/07/19 1243) Vital Signs (24h Range):  Temp:  [96 °F (35.6 °C)-98.2 °F (36.8 °C)] 97.6 °F (36.4 °C)  Pulse:  [61-87] 85  Resp:  [16-38] 18  SpO2:  [87 %-100 %] 96 %  BP: ()/(40-63) 100/57     Weight: 66.7 kg (147 lb 0.8 oz)  Body mass index is 27.78 kg/m².    SpO2: 96 %  O2 Device (Oxygen Therapy): nasal cannula      Intake/Output Summary (Last 24 hours) at 9/7/2019 1506  Last data filed at 9/7/2019 0100  Gross per 24 hour   Intake 625 ml   Output 50 ml   Net 575 ml       Lines/Drains/Airways     Peripheral Intravenous Line                 Peripheral IV - Single Lumen 09/06/19 1550 20 G Left Antecubital less than 1 day                Physical Exam   Constitutional: She is oriented to person, place, and time. She appears well-developed and well-nourished. She is not intubated.     Sleepy during the exam          HENT:   Head: Normocephalic and atraumatic.   Right Ear: External ear normal.   Left Ear: External ear normal.   Mouth/Throat: Oropharynx is clear and moist.   Eyes: Pupils are equal, round, and reactive to light. Conjunctivae and EOM are normal. Right eye exhibits no discharge. Left eye exhibits no discharge. No scleral icterus.   Neck: Normal range of motion. Neck supple. Normal carotid pulses, no hepatojugular reflux and no JVD present. Carotid bruit is not present. No tracheal deviation present. No thyromegaly present.   Cardiovascular: Normal rate, regular rhythm, S1 normal and S2 normal.  No extrasystoles are present. PMI is not displaced. Exam reveals no gallop, no S3, no distant  heart sounds, no friction rub and no midsystolic click.   No murmur heard.  Pulses:       Carotid pulses are 2+ on the right side, and 2+ on the left side.       Radial pulses are 2+ on the right side, and 2+ on the left side.        Femoral pulses are 2+ on the right side, and 2+ on the left side.       Popliteal pulses are 2+ on the right side, and 2+ on the left side.        Dorsalis pedis pulses are 1+ on the right side, and 1+ on the left side.        Posterior tibial pulses are 1+ on the right side, and 1+ on the left side.   Pulmonary/Chest: Effort normal. No accessory muscle usage or stridor. No apnea, no tachypnea and no bradypnea. She is not intubated. No respiratory distress. She has decreased breath sounds in the right lower field and the left lower field. She has no wheezes. She has no rales. She exhibits no tenderness and no bony tenderness.   Abdominal: She exhibits no distension, no pulsatile liver, no abdominal bruit, no ascites, no pulsatile midline mass and no mass. There is no tenderness. There is no rebound and no guarding.   Musculoskeletal: Normal range of motion. She exhibits no edema or tenderness.   Lymphadenopathy:     She has no cervical adenopathy.       Trace edema L foot  1 + edema R foot    Neurological: She is alert and oriented to person, place, and time. She displays atrophy and abnormal reflex. No cranial nerve deficit. She exhibits abnormal muscle tone. Coordination and gait abnormal.   Chronic R sided weakness      No new focal deficit    Skin: Skin is warm. No rash noted. No erythema. No pallor.   Psychiatric: She has a normal mood and affect. Her behavior is normal. Judgment and thought content normal.       Significant Labs:     LABS  CBC  Recent Labs   Lab 09/06/19  1555 09/07/19  0603   WBC 8.60 10.02  10.02   RBC 3.99* 3.74*  3.74*   HGB 11.7* 11.0*  11.0*   HCT 37.8 37.2  37.2    168  168   MCV 95 100*  100*   MCH 29.3 29.4  29.4   MCHC 31.0* 29.6*  29.6*      BMP  Recent Labs   Lab 09/06/19  1555 09/07/19  0603    137   K 4.4 4.6   CO2 30* 26    104   BUN 33* 36*   CREATININE 1.3 1.5*    127*       POCT-Glucose  POCT Glucose   Date Value Ref Range Status   09/07/2019 143 (H) 70 - 110 mg/dL Final   09/07/2019 134 (H) 70 - 110 mg/dL Final   09/06/2019 118 (H) 70 - 110 mg/dL Final       Recent Labs   Lab 09/06/19  1555 09/06/19 2044 09/07/19  0603   CALCIUM 10.3  --  9.3   MG 1.9 1.9 1.8   PHOS 3.9 4.7* 4.8*     LFT  Recent Labs   Lab 09/06/19  1555 09/07/19  0603   PROT 7.4 5.8*   ALBUMIN 3.3* 2.6*   BILITOT 0.9 0.5   AST 25 24   ALKPHOS 88 71   ALT 16 14     GFR    COAGS  Recent Labs   Lab 09/06/19  1555 09/06/19 2044 09/07/19 0317 09/07/19  0603   INR 1.1 1.2  --   --    APTT 31.4 69.2* 66.1* 59.2*     CE  Recent Labs   Lab 09/06/19 2044 09/07/19 0317 09/07/19  0800   TROPONINI 0.027* 0.017 0.018     ABGs  No results for input(s): PH, PCO2, PO2, HCO3, POCSATURATED, BE in the last 24 hours.  BNP  Recent Labs   Lab 09/06/19  1555   *       LAST HbA1c  No results found for: HGBA1C    Lipid panel  Lab Results   Component Value Date    CHOL 86 (L) 08/26/2019    CHOL 87 (L) 02/11/2019    CHOL 87 (L) 07/02/2018     Lab Results   Component Value Date    HDL 45 08/26/2019    HDL 47 02/11/2019    HDL 47 07/02/2018     Lab Results   Component Value Date    LDLCALC 32.4 (L) 08/26/2019    LDLCALC 29.8 (L) 02/11/2019    LDLCALC 32.0 (L) 07/02/2018     Lab Results   Component Value Date    TRIG 43 08/26/2019    TRIG 51 02/11/2019    TRIG 40 07/02/2018     Lab Results   Component Value Date    CHOLHDL 52.3 (H) 08/26/2019    CHOLHDL 54.0 (H) 02/11/2019    CHOLHDL 54.0 (H) 07/02/2018          Significant Imaging:               Assessment and Plan:     * NSTEMI (non-ST elevated myocardial infarction)      NSTEMI with normalized TNI  Normal EF  Diastolic dysfunction  Severe TR  SSS with PPM  Afib and chronic OAC  DNR  Acute on chronic RF          Medical  therapy for ACS  No invasive procedures  DC heparin  Continue with plavix + eliquis                    VTE Risk Mitigation (From admission, onward)        Ordered     heparin 25,000 units in dextrose 5% (100 units/ml) IV bolus from bag INITIAL BOLUS (max bolus 4000 units)  Once      09/06/19 2014     heparin 25,000 units in dextrose 5% 250 mL (100 units/mL) infusion LOW INTENSITY nomogram - [facility]  Continuous      09/06/19 2014     heparin 25,000 units in dextrose 5% (100 units/ml) IV bolus from bag - ADDITIONAL PRN BOLUS - 60 units/kg (max bolus 4000 units)  As needed (PRN)      09/06/19 2014     heparin 25,000 units in dextrose 5% (100 units/ml) IV bolus from bag - ADDITIONAL PRN BOLUS - 30 units/kg (max bolus 4000 units)  As needed (PRN)      09/06/19 2014     IP VTE HIGH RISK PATIENT  Once      09/06/19 2014     Place sequential compression device  Until discontinued      09/06/19 2014          Thank you for your consult.         Andre Bosch MD  Cardiology   [facility]

## 2019-09-07 NOTE — NURSING
Report received from Kendra ABAD at [facility]. Comfort measures initiated.  Call light with reach of pt.  Bed in low locked position with side rails up x 2.  Care assumed, daughter at bedside. Pt AAOx4.  Pt VSS. Tele applied. Will continue to monitor.

## 2019-09-07 NOTE — PLAN OF CARE
Problem: Adult Inpatient Plan of Care  Goal: Plan of Care Review  Outcome: Ongoing (interventions implemented as appropriate)     09/07/19 0602   Plan of Care Review   Plan of Care Reviewed With patient;daughter   POC reviewed with the pt and pt's daughter, verbalized understanding.  Low BP noted at admit, 500cc bolus of NS given.  Unable to express words due to previous stroke.  Right side paralysis noted.  Pt's daughter at the bedside.  No complaint of pain or any other distress noted throughout night.  Infusing D5 1/2NS @ 75mL/hr.  IV heparin infusing @ 6.6mL/hr, 12units/kg/hr, aPTT will be reviewed in the morning.  On 2L oxygen via NC, pulse ox remained <94% overnight.  On continuous telemetry monitor, paced.  No ectopy noted.  Blood glucose monitored.  Blanchable redness noted on buttocks, turned pt with wedge q2h.  Heel offloading device applied.  Urine culture collected.  Pt is NPO.  IV antibiotic given.  Fall precaution explained, contract signed.  Safety maintained, free of falls throughout shift.  Instructed to call for any assistance.  Will continue to monitor.    0645; bladder scan performed, residual 146cc noted.

## 2019-09-07 NOTE — PLAN OF CARE
Problem: Adult Inpatient Plan of Care  Goal: Plan of Care Review  Outcome: Ongoing (interventions implemented as appropriate)  Pt received on nasal cannula at   1 lpm.  SPO2   93%.  Pt in no apparent respiratory distress.  Will continue to monitor.

## 2019-09-07 NOTE — HPI
89 year old WF DNR who here for evaluation of CP with abnormal TNI 0.036 which has trended down to normal. She has multiple medical problems and not a candidate for invasive cardiac procedures. Her daughter a retired RN agreed with the plan.       Echo      · Normal left ventricular systolic function. The estimated ejection fraction is 55%  · Left ventricular diastolic dysfunction.  · Normal right ventricular systolic function.  · Severe left atrial enlargement.  · Severe right atrial enlargement.  · Severe tricuspid regurgitation.  · Pulmonary hypertension present.  · Mild aortic regurgitation.  · Normal central venous pressure (3 mm Hg).  · The estimated PA systolic pressure is 44 mm Hg       ECG: V paced rhythm

## 2019-09-07 NOTE — NURSING
Notified Dr. Barrow and Dr. Goel that pt is in the room with daughter, pt's admit BP is low, highest one with machine; 98/48, will be checked by manually and report to MD.  MD will visualize pt soon.    2025; Dr. Goel at the beside, manual BP notified 82/40.  MD will order NS 500cc bolus.

## 2019-09-07 NOTE — PT/OT/SLP PROGRESS
Physical Therapy Screen      Patient Name:  Mattie Belle   MRN:  [id number]    Patient not seen today secondary to Pt lethargic and unable to fully arouse.  Per interview with daughter at bedside, pt is W/C bound and required assist for W/C t/f's and ADL's PTA.  Pt lives with her son in a [facility] with a ramp present at entry.  Pt's son is primary CG and granddaughter comes daily to assist with ADL's.  DME at home includes:  W/C, shower chair, hospital bed, BSC, and AFO.  Per Daughter, Pt will have help at home and is not in need of any DME.  PT to sign off. OK for OOB to chair and BSC with nursing staff.     Shayy Mccoy, PT

## 2019-09-07 NOTE — SUBJECTIVE & OBJECTIVE
Past Medical History:   Diagnosis Date    *Atrial fibrillation     Atrial fibrillation     CHF (congestive heart failure)     CHF and MI in April , 2015    Encounter for blood transfusion     Hyperlipidemia     Hypertension     Stroke        Past Surgical History:   Procedure Laterality Date    CHOLECYSTECTOMY      HYSTERECTOMY      INSERT / REPLACE / REMOVE PACEMAKER      REPLACEMENT, PACEMAKER GENERATOR N/A 8/20/2018    Performed by Dami Du MD at [facility] CATH LAB       Review of patient's allergies indicates:  No Known Allergies    Current Facility-Administered Medications on File Prior to Encounter   Medication    [COMPLETED] albuterol-ipratropium 2.5 mg-0.5 mg/3 mL nebulizer solution 3 mL    [COMPLETED] aspirin tablet 325 mg    [COMPLETED] atorvastatin tablet 40 mg    [COMPLETED] azithromycin 500 mg in dextrose 5 % 250 mL IVPB (ready to mix system)    [COMPLETED] cefTRIAXone (ROCEPHIN) 1 g in dextrose 5 % 50 mL IVPB    [COMPLETED] GI cocktail (mylanta 30 mL, lidocaine 2 % viscous 10 mL, dicyclomine 10 mL) 50 mL    [COMPLETED] heparin 25,000 units in dextrose 5% (100 units/ml) IV bolus from bag INITIAL BOLUS (max bolus 4000 units)    [COMPLETED] nitroGLYCERIN 2% TD oint ointment 0.5 inch    [COMPLETED] pantoprazole EC tablet 40 mg    [COMPLETED] sodium chloride 0.9% bolus 500 mL    [DISCONTINUED] heparin 25,000 units in dextrose 5% (100 units/ml) IV bolus from bag - ADDITIONAL PRN BOLUS - 30 units/kg (max bolus 4000 units)    [DISCONTINUED] heparin 25,000 units in dextrose 5% (100 units/ml) IV bolus from bag - ADDITIONAL PRN BOLUS - 60 units/kg (max bolus 4000 units)    [DISCONTINUED] heparin 25,000 units in dextrose 5% 250 mL (100 units/mL) infusion LOW INTENSITY nomogram - [facility]     Current Outpatient Medications on File Prior to Encounter   Medication Sig    atorvastatin (LIPITOR) 40 MG tablet TAKE 1 TABLET DAILY    C,E,zinc,copper 11/htbot5n/lut (OCUVITE ADULT 50 PLUS ORAL)  Take by mouth every evening.    carvedilol (COREG) 12.5 MG tablet TAKE 1 TABLET TWICE A DAY    cholecalciferol, vitamin D3, (VITAMIN D3) 5,000 unit Tab Take 1 capsule by mouth every evening.     clopidogrel (PLAVIX) 75 mg tablet Take 1 tablet (75 mg total) by mouth once daily.    ELIQUIS 2.5 mg Tab TAKE 1 TABLET TWICE A DAY    ferrous sulfate 325 (65 FE) MG EC tablet Take 325 mg by mouth 2 (two) times daily.    nystatin (NYSTOP) powder APPLY TO AFFECTED AREA THREE TIMES A DAY    ondansetron (ZOFRAN-ODT) 4 MG TbDL Take 4 mg by mouth every 8 (eight) hours as needed.    pantoprazole (PROTONIX) 40 MG tablet TAKE 1 TABLET DAILY    spironolactone (ALDACTONE) 25 MG tablet TAKE 1 TABLET DAILY (Patient taking differently: Take 1/2 tablet daily)    nitroGLYCERIN 0.4 MG/DOSE TL SPRY (NITROLINGUAL) 400 mcg/spray spray Place 1 spray under the tongue every 5 (five) minutes as needed for Chest pain (max 3 doses). Max 3 doses    nystatin (MYCOSTATIN) cream Apply topically 2 (two) times daily.    sodium bicarbonate/sod citrat (HOWARD-SELTZER HEARTBURN ORAL) Take by mouth as needed.     Family History     Problem Relation (Age of Onset)    Heart disease Father    Hypertension Father        Tobacco Use    Smoking status: Never Smoker    Smokeless tobacco: Never Used   Substance and Sexual Activity    Alcohol use: No    Drug use: No    Sexual activity: Not on file     Review of Systems   Constitution: Positive for malaise/fatigue. Negative for diaphoresis, night sweats, weight gain and weight loss.   HENT: Negative for congestion.    Eyes: Negative for blurred vision, discharge and double vision.   Cardiovascular: Positive for chest pain. Negative for claudication, cyanosis, dyspnea on exertion, irregular heartbeat, leg swelling, near-syncope, orthopnea, palpitations, paroxysmal nocturnal dyspnea and syncope.   Respiratory: Negative for cough, shortness of breath and wheezing.    Endocrine: Negative for cold intolerance,  heat intolerance and polyphagia.   Hematologic/Lymphatic: Negative for adenopathy and bleeding problem. Does not bruise/bleed easily.   Skin: Negative for dry skin and nail changes.   Musculoskeletal: Negative for arthritis, back pain, falls, joint pain, myalgias and neck pain.   Gastrointestinal: Negative for bloating, abdominal pain, change in bowel habit and constipation.   Genitourinary: Negative for bladder incontinence, dysuria, flank pain, genital sores and missed menses.   Neurological: Negative for aphonia, brief paralysis, difficulty with concentration, dizziness and weakness.        Chronic R sided weakness   Psychiatric/Behavioral: Negative for altered mental status and memory loss. The patient does not have insomnia.    Allergic/Immunologic: Negative for environmental allergies.     Objective:     Vital Signs (Most Recent):  Temp: 97.6 °F (36.4 °C) (09/07/19 1243)  Pulse: 85 (09/07/19 1243)  Resp: 18 (09/07/19 1243)  BP: (!) 100/57 (09/07/19 1243)  SpO2: 96 % (09/07/19 1243) Vital Signs (24h Range):  Temp:  [96 °F (35.6 °C)-98.2 °F (36.8 °C)] 97.6 °F (36.4 °C)  Pulse:  [61-87] 85  Resp:  [16-38] 18  SpO2:  [87 %-100 %] 96 %  BP: ()/(40-63) 100/57     Weight: 66.7 kg (147 lb 0.8 oz)  Body mass index is 27.78 kg/m².    SpO2: 96 %  O2 Device (Oxygen Therapy): nasal cannula      Intake/Output Summary (Last 24 hours) at 9/7/2019 1506  Last data filed at 9/7/2019 0100  Gross per 24 hour   Intake 625 ml   Output 50 ml   Net 575 ml       Lines/Drains/Airways     Peripheral Intravenous Line                 Peripheral IV - Single Lumen 09/06/19 1550 20 G Left Antecubital less than 1 day                Physical Exam   Constitutional: She is oriented to person, place, and time. She appears well-developed and well-nourished. She is not intubated.     Sleepy during the exam          HENT:   Head: Normocephalic and atraumatic.   Right Ear: External ear normal.   Left Ear: External ear normal.   Mouth/Throat:  Oropharynx is clear and moist.   Eyes: Pupils are equal, round, and reactive to light. Conjunctivae and EOM are normal. Right eye exhibits no discharge. Left eye exhibits no discharge. No scleral icterus.   Neck: Normal range of motion. Neck supple. Normal carotid pulses, no hepatojugular reflux and no JVD present. Carotid bruit is not present. No tracheal deviation present. No thyromegaly present.   Cardiovascular: Normal rate, regular rhythm, S1 normal and S2 normal.  No extrasystoles are present. PMI is not displaced. Exam reveals no gallop, no S3, no distant heart sounds, no friction rub and no midsystolic click.   No murmur heard.  Pulses:       Carotid pulses are 2+ on the right side, and 2+ on the left side.       Radial pulses are 2+ on the right side, and 2+ on the left side.        Femoral pulses are 2+ on the right side, and 2+ on the left side.       Popliteal pulses are 2+ on the right side, and 2+ on the left side.        Dorsalis pedis pulses are 1+ on the right side, and 1+ on the left side.        Posterior tibial pulses are 1+ on the right side, and 1+ on the left side.   Pulmonary/Chest: Effort normal. No accessory muscle usage or stridor. No apnea, no tachypnea and no bradypnea. She is not intubated. No respiratory distress. She has decreased breath sounds in the right lower field and the left lower field. She has no wheezes. She has no rales. She exhibits no tenderness and no bony tenderness.   Abdominal: She exhibits no distension, no pulsatile liver, no abdominal bruit, no ascites, no pulsatile midline mass and no mass. There is no tenderness. There is no rebound and no guarding.   Musculoskeletal: Normal range of motion. She exhibits no edema or tenderness.   Lymphadenopathy:     She has no cervical adenopathy.       Trace edema L foot  1 + edema R foot    Neurological: She is alert and oriented to person, place, and time. She displays atrophy and abnormal reflex. No cranial nerve deficit.  She exhibits abnormal muscle tone. Coordination and gait abnormal.   Chronic R sided weakness      No new focal deficit    Skin: Skin is warm. No rash noted. No erythema. No pallor.   Psychiatric: She has a normal mood and affect. Her behavior is normal. Judgment and thought content normal.       Significant Labs:     LABS  CBC  Recent Labs   Lab 09/06/19  1555 09/07/19  0603   WBC 8.60 10.02  10.02   RBC 3.99* 3.74*  3.74*   HGB 11.7* 11.0*  11.0*   HCT 37.8 37.2  37.2    168  168   MCV 95 100*  100*   MCH 29.3 29.4  29.4   MCHC 31.0* 29.6*  29.6*     BMP  Recent Labs   Lab 09/06/19  1555 09/07/19  0603    137   K 4.4 4.6   CO2 30* 26    104   BUN 33* 36*   CREATININE 1.3 1.5*    127*       POCT-Glucose  POCT Glucose   Date Value Ref Range Status   09/07/2019 143 (H) 70 - 110 mg/dL Final   09/07/2019 134 (H) 70 - 110 mg/dL Final   09/06/2019 118 (H) 70 - 110 mg/dL Final       Recent Labs   Lab 09/06/19  1555 09/06/19 2044 09/07/19  0603   CALCIUM 10.3  --  9.3   MG 1.9 1.9 1.8   PHOS 3.9 4.7* 4.8*     LFT  Recent Labs   Lab 09/06/19 1555 09/07/19  0603   PROT 7.4 5.8*   ALBUMIN 3.3* 2.6*   BILITOT 0.9 0.5   AST 25 24   ALKPHOS 88 71   ALT 16 14     GFR    COAGS  Recent Labs   Lab 09/06/19  1555 09/06/19 2044 09/07/19 0317 09/07/19  0603   INR 1.1 1.2  --   --    APTT 31.4 69.2* 66.1* 59.2*     CE  Recent Labs   Lab 09/06/19 2044 09/07/19 0317 09/07/19  0800   TROPONINI 0.027* 0.017 0.018     ABGs  No results for input(s): PH, PCO2, PO2, HCO3, POCSATURATED, BE in the last 24 hours.  BNP  Recent Labs   Lab 09/06/19  1555   *       LAST HbA1c  No results found for: HGBA1C    Lipid panel  Lab Results   Component Value Date    CHOL 86 (L) 08/26/2019    CHOL 87 (L) 02/11/2019    CHOL 87 (L) 07/02/2018     Lab Results   Component Value Date    HDL 45 08/26/2019    HDL 47 02/11/2019    HDL 47 07/02/2018     Lab Results   Component Value Date    LDLCALC 32.4 (L) 08/26/2019     LDLCALC 29.8 (L) 02/11/2019    LDLCALC 32.0 (L) 07/02/2018     Lab Results   Component Value Date    TRIG 43 08/26/2019    TRIG 51 02/11/2019    TRIG 40 07/02/2018     Lab Results   Component Value Date    CHOLHDL 52.3 (H) 08/26/2019    CHOLHDL 54.0 (H) 02/11/2019    CHOLHDL 54.0 (H) 07/02/2018          Significant Imaging:

## 2019-09-07 NOTE — NURSING
Patient refuses her oxygen. O2 sat. 81% and refuses the PCT to take her VSs and Accuchek. Daughter at bedside. MD notified and states will come to see the patient. Will continue to monitor.

## 2019-09-07 NOTE — H&P
History & Physical  Family Medicine    Subjective    PNA     History of Present Illness:  89 y.o. 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 Family member provides whole history and patient is sleeping and nonverbal. Caregiver notes she lives with her brother and is in a wheelchair and does not walk or speak much due to her past stroke. For the past few days she was feeling tired and had an episode of nausea with one vomiting incidence. She did not complain of CP but does not speak much. She can understand her family when spoken to. No fevers, cough, diarrhea or other symptoms per family. She went to an OSH and CXR showed PNA and NSTEMI with an EKG no ST changes, trop elevated at 0.036, and an elevated BNP. Last echo was in 2015 with normal EF. No recent echo. She saw a cardiologist around a year ago. Cardiology was called and recommended a heparin drip and admission. She was started on abx for a PNA and UTI. Family member notes she is DNR.       Review of Systems     Review of Systems   Constitutional: Negative for chills, fever and weight loss.   HENT: Negative for congestion, nosebleeds and sore throat.    Eyes: Negative for blurred vision, photophobia and pain.   Respiratory: Positive for shortness of breath. Negative for cough and wheezing.    Cardiovascular: Positive for leg swelling. Negative for chest pain.   Gastrointestinal: Positive for nausea and vomiting. Negative for abdominal pain, blood in stool, constipation, diarrhea and melena.   Genitourinary: Negative for dysuria and hematuria.   Musculoskeletal: Negative for falls and myalgias.   Skin: Negative for itching and rash.   Neurological: Negative for seizures and weakness.   Endo/Heme/Allergies: Does not bruise/bleed easily.   Psychiatric/Behavioral: Negative  for depression, hallucinations and suicidal ideas.         Past Medical History:   Diagnosis Date    *Atrial fibrillation     Atrial fibrillation     CHF (congestive heart failure)     CHF and MI in April , 2015    Hyperlipidemia     Hypertension     Stroke        Past Surgical History:   Procedure Laterality Date    CHOLECYSTECTOMY      HYSTERECTOMY      INSERT / REPLACE / REMOVE PACEMAKER      REPLACEMENT, PACEMAKER GENERATOR N/A 8/20/2018    Performed by Dami Du MD at [facility] CATH LAB       Family History   Problem Relation Age of Onset    Hypertension Father     Heart disease Father        Social History     Socioeconomic History    Marital status:      Spouse name: Not on file    Number of children: Not on file    Years of education: Not on file    Highest education level: Not on file   Occupational History    Not on file   Social Needs    Financial resource strain: Not on file    Food insecurity:     Worry: Not on file     Inability: Not on file    Transportation needs:     Medical: Not on file     Non-medical: Not on file   Tobacco Use    Smoking status: Never Smoker    Smokeless tobacco: Never Used   Substance and Sexual Activity    Alcohol use: No    Drug use: No    Sexual activity: Not on file   Lifestyle    Physical activity:     Days per week: Not on file     Minutes per session: Not on file    Stress: Only a little   Relationships    Social connections:     Talks on phone: Not on file     Gets together: Not on file     Attends [employer] service: Not on file     Active member of club or organization: Not on file     Attends meetings of clubs or organizations: Not on file     Relationship status: Not on file   Other Topics Concern    Not on file   Social History Narrative    Not on file       No current facility-administered medications for this encounter.        Review of patient's allergies indicates:  No Known Allergies    Objective  Temp:  [96 °F (35.6  °C)-98.2 °F (36.8 °C)]   Pulse:  [74-87]   Resp:  [20-38]   BP: ()/(48-63)   SpO2:  [87 %-100 %]   There is no height or weight on file to calculate BMI.     Physical Exam:    General: pale and sleeping nonverbal does open to command   HEENT: Conjunctivae and EOM are normal. No scleral icterus.   Neck: supple. No masses. No thyromegaly. No bruits.  Lymph nodes: no lymphadenopathy  Cardio: RRR. S1, S2 normal without murmur/gallop/rub. No S3, S4. chest pain elicited  with palpation of left chest. Intact distal pulses.   Pulmonary: coarse breath sounds bilaterally   Skin: pale with senile purpura  Abdomen: bandage on chest where melanoma incision taken. soft, non-tender, non-distended. No masses. No rebound/guarding. No  hepatosplenomegaly. +BS  Extremities: no cyanosis, clubbing. Bilateral leg edema..  Neuro: right sided paralysis     Laboratory:    Most Recent Data:  CBC:   Lab Results   Component Value Date    WBC 8.60 09/06/2019    HGB 11.7 (L) 09/06/2019    HCT 37.8 09/06/2019     09/06/2019    MCV 95 09/06/2019    RDW 13.7 09/06/2019     BMP:   Lab Results   Component Value Date     09/06/2019    K 4.4 09/06/2019     09/06/2019    CO2 30 (H) 09/06/2019    BUN 33 (H) 09/06/2019     09/06/2019    CALCIUM 10.3 09/06/2019    MG 1.9 09/06/2019    PHOS 3.9 09/06/2019     LFTs:   Lab Results   Component Value Date    PROT 7.4 09/06/2019    ALBUMIN 3.3 (L) 09/06/2019    BILITOT 0.9 09/06/2019    AST 25 09/06/2019    ALKPHOS 88 09/06/2019    ALT 16 09/06/2019     Coags:   Lab Results   Component Value Date    INR 1.1 09/06/2019     FLP:   Lab Results   Component Value Date    CHOL 86 (L) 08/26/2019    HDL 45 08/26/2019    LDLCALC 32.4 (L) 08/26/2019    TRIG 43 08/26/2019    CHOLHDL 52.3 (H) 08/26/2019     DM:   Lab Results   Component Value Date    LDLCALC 32.4 (L) 08/26/2019    CREATININE 1.3 09/06/2019     HgbA1c: No results found for: HGBA1C  Thyroid:   Lab Results   Component Value Date     TSH 2.779 08/26/2019     Anemia: No results found for: IRON, TIBC, FERRITIN, [id number], FOLATE  Cardiac:   Lab Results   Component Value Date    TROPONINI 0.036 (H) 09/06/2019     (H) 09/06/2019     Trended Lab Data:  Recent Labs   Lab 09/06/19  1555   WBC 8.60   HGB 11.7*   HCT 37.8      MCV 95   RDW 13.7      K 4.4      CO2 30*   BUN 33*      PROT 7.4   ALBUMIN 3.3*   BILITOT 0.9   AST 25   ALKPHOS 88   ALT 16     Trended Cardiac Data:  Recent Labs   Lab 09/06/19  1555   TROPONINI 0.036*   *       No results found for: EF    No results found for this visit on 09/06/19.    Microbiology Results (last 7 days)     ** No results found for the last 168 hours. **          Urinalysis:   Lab Results   Component Value Date    COLORU Yellow 09/06/2019    SPECGRAV >=1.030 (A) 09/06/2019    NITRITE Negative 09/06/2019    KETONESU Negative 09/06/2019    UROBILINOGEN 2.0-3.0 (A) 09/06/2019       No orders to display     RML and RLL PNA     Assessment/Plan:     89 y.o. 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     NEURO-PSYCH:    History of stroke  Does not speak much  Stroke deficit with paralysis of right side and dysarthria  Speech study     CV    NSTEMI  Trend trops x 3 and EKG x 3  ASA  Plavix   Nitro sublingual prn  Holding Beta blocker due to hypotension   Holding ACE due to hypotension   Anticoagulation heparin  Atorvastatin  Cardiology Consulted     No results found for this visit on 09/06/19.  Recent Labs   Lab 09/06/19  1555   TROPONINI 0.036*   *    No results found for: EF  - CXR enlarged heart and Right PNA    CHF  BNP elevated  Last echo 2015 EF 65%  Echo ordered  Giving fluids for sepsis can always diuresis if needed  later  Talked with caregiver who wants DNR at this time     Afib  Pacemaker in place  Pulse WNL  Holding BB due to hypotension    HLD  Statin     PULM:     PNA  CXR showed right middle and lower PNA   Azithromycin and rocephin   Cultures  LA WNL     ID  PNA  F/u cultures  Rocephin and azithromycin    UTI  Treating  Culture f/u     RENAL:     IVFs   Strict I&Os  Avoid renal toxic meds  Lab Results   Component Value Date    CREATININE 1.3 09/06/2019         FEN-GI:    NPO  IV fluids  Keep Mg 2, K 4    ENDO:    SSI  No results for input(s): POCTGLUCOSE in the last 168 hours. No results found for: HGBA1C No results found for: LIPASE   Lab Results   Component Value Date    ALT 16 09/06/2019    AST 25 09/06/2019    ALKPHOS 88 09/06/2019    BILITOT 0.9 09/06/2019     Lab Results   Component Value Date    TSH 2.779 08/26/2019       CODE: DNR  DVT Prophylaxis: heparin     Andrew Goel MD  Feel free to Secure Chat (Ctrl+Alt+5) me for any questions   09/06/2019

## 2019-09-07 NOTE — PLAN OF CARE
Discussed end of life issues with Pt's family who states that pt has stated previously that she would not want CPR or ACLS or Intubation in event of cardiorespiratory arrest or failure and will proceed with DNR status for this patient.    Andrew Goel MD  Feel free to Secure Chat (Ctrl+Alt+5) me for any questions   09/07/2019

## 2019-09-07 NOTE — PLAN OF CARE
VN note: VN cued into patient's room for introduction. Patient asleep, daughter at bedside. VN informed daughter that VN would be working closely along side bedside nurse, PCT, and the rest of care team and making rounds throughout the shift. Daughter verbalized understanding. Allowed time for questions. VN will continue to be available to patient and intervene prn.       09/07/19 1318   Type of Frequent Check   Type Patient Rounds;Other (see comments)  (VN Rounds)   Safety/Activity   Patient Rounds bed in low position;visualized patient   Safety Promotion/Fall Prevention side rails raised x 2;family to remain at bedside   Activity Management activity adjusted per tolerance   Positioning   Body Position supine   Head of Bed (HOB) HOB at 60-90 degrees   Assessments (Pre/Post)   Level of Consciousness (AVPU)   (Patient asleep at this time.)

## 2019-09-07 NOTE — ASSESSMENT & PLAN NOTE
NSTEMI with normalized TNI  Normal EF  Diastolic dysfunction  Severe TR  SSS with PPM  Afib and chronic OAC  DNR  Acute on chronic RF          Medical therapy for ACS  No invasive procedures  DC heparin  Continue with plavix + eliquis

## 2019-09-07 NOTE — PROGRESS NOTES
[facility]  Progress Note    Patient Name: Mattie Belle  MRN: [id number]  Patient Class: IP- Inpatient   Admission Date: 9/6/2019  Length of Stay: 1 days  Attending Physician: Vimal Miner MD  Primary Care Provider: Yunior Pagan MD    Subjective:     Interval History: No acute events overnight. Pt resting well, in no acute distress. Discussed end of life care with patient's daughter. No acute concerns at this time.      Review of Systems   Constitutional: Negative for chills and fever.   HENT: Negative for congestion, sinus pain and sore throat.    Eyes: Negative for blurred vision, double vision and photophobia.   Respiratory: Negative for cough, hemoptysis, shortness of breath and wheezing.    Cardiovascular: Positive for leg swelling. Negative for chest pain, palpitations, orthopnea and PND.   Gastrointestinal: Negative for abdominal pain, constipation, diarrhea, nausea and vomiting.   Genitourinary: Negative for dysuria, frequency and urgency.   Musculoskeletal: Negative.    Skin: Negative for itching and rash.   Neurological: Negative for dizziness, speech change, seizures, loss of consciousness, weakness and headaches.   Endo/Heme/Allergies: Negative.    Psychiatric/Behavioral: Negative.      Objective:     Vital Signs (Most Recent):  Temp: 97.6 °F (36.4 °C) (09/07/19 1243)  Pulse: 85 (09/07/19 1243)  Resp: 18 (09/07/19 1243)  BP: (!) 100/57 (09/07/19 1243)  SpO2: 96 % (09/07/19 1243) Vital Signs (24h Range):  Temp:  [96 °F (35.6 °C)-98.2 °F (36.8 °C)] 97.6 °F (36.4 °C)  Pulse:  [61-87] 85  Resp:  [16-38] 18  SpO2:  [87 %-100 %] 96 %  BP: ()/(40-63) 100/57     Weight: 66.7 kg (147 lb 0.8 oz)  Body mass index is 27.78 kg/m².    Intake/Output Summary (Last 24 hours) at 9/7/2019 1340  Last data filed at 9/7/2019 0100  Gross per 24 hour   Intake 625 ml   Output 50 ml   Net 575 ml      Physical Exam   Constitutional: She is oriented to person, place, and time. She appears well-developed  and well-nourished. No distress.   HENT:   Head: Normocephalic and atraumatic.   Nose: Nose normal.   Mouth/Throat: Oropharynx is clear and moist. No oropharyngeal exudate.   Eyes: Pupils are equal, round, and reactive to light. Conjunctivae and EOM are normal.   Neck: Normal range of motion. Neck supple. No JVD present. No thyromegaly present.   Cardiovascular: Normal rate, regular rhythm, normal heart sounds and intact distal pulses. Exam reveals no gallop and no friction rub.   No murmur heard.  Pulmonary/Chest: Effort normal and breath sounds normal. No respiratory distress. She has no wheezes. She has no rales.   Abdominal: Soft. Bowel sounds are normal. She exhibits no distension and no mass. There is no tenderness. There is no guarding.   Musculoskeletal: Normal range of motion. She exhibits edema (BLE). She exhibits no tenderness or deformity.   Lymphadenopathy:     She has no cervical adenopathy.   Neurological: She is alert and oriented to person, place, and time. No cranial nerve deficit.   Skin: Skin is warm and dry. Capillary refill takes less than 2 seconds. No rash noted. She is not diaphoretic. No erythema. No pallor.   Psychiatric: She has a normal mood and affect. Her behavior is normal. Judgment and thought content normal.     Significant Labs:   A1C: No results for input(s): HGBA1C in the last 4320 hours.  ABGs: No results for input(s): PH, PCO2, HCO3, POCSATURATED, BE, TOTALHB, COHB, METHB, O2HB, POCFIO2 in the last 48 hours.  Bilirubin:   Recent Labs   Lab 08/26/19  0833 09/06/19  1555 09/07/19  0603   BILITOT 0.7 0.9 0.5     Blood Culture:   Recent Labs   Lab 09/06/19  1702   LABBLOO No Growth to date  No Growth to date     BMP:   Recent Labs   Lab 09/07/19  0603   *      K 4.6      CO2 26   BUN 36*   CREATININE 1.5*   CALCIUM 9.3   MG 1.8     CBC:   Recent Labs   Lab 09/06/19  1555 09/07/19  0603   WBC 8.60 10.02  10.02   HGB 11.7* 11.0*  11.0*   HCT 37.8 37.2  37.2     168  168     CMP:   Recent Labs   Lab 09/06/19  1555 09/07/19  0603    137   K 4.4 4.6    104   CO2 30* 26    127*   BUN 33* 36*   CREATININE 1.3 1.5*   CALCIUM 10.3 9.3   PROT 7.4 5.8*   ALBUMIN 3.3* 2.6*   BILITOT 0.9 0.5   ALKPHOS 88 71   AST 25 24   ALT 16 14   ANIONGAP 10 7*   EGFRNONAA 36* 30*     Cardiac Markers:   Recent Labs   Lab 09/06/19  1555   *     Coagulation:   Recent Labs   Lab 09/06/19 2044 09/07/19  0603   INR 1.2  --   --    APTT 69.2*   < > 59.2*    < > = values in this interval not displayed.     Lactic Acid:   Recent Labs   Lab 09/06/19  1702   LACTATE 0.8     Lipase: No results for input(s): LIPASE in the last 48 hours.  Lipid Panel: No results for input(s): CHOL, HDL, LDLCALC, TRIG, CHOLHDL in the last 48 hours.  Magnesium:   Recent Labs   Lab 09/06/19  1555 09/06/19 2044 09/07/19  0603   MG 1.9 1.9 1.8     Pathology Results  (Last 10 years)    None        POCT Glucose:   Recent Labs   Lab 09/06/19  2106 09/07/19  0625 09/07/19  1139   POCTGLUCOSE 118* 134* 143*     Prealbumin: No results for input(s): PREALBUMIN in the last 48 hours.  Respiratory Culture: No results for input(s): GSRESP, RESPIRATORYC in the last 48 hours.  Troponin:   Recent Labs   Lab 09/06/19  2044 09/07/19  0317 09/07/19  0800   TROPONINI 0.027* 0.017 0.018     TSH:   Recent Labs   Lab 08/26/19  0833   TSH 2.779     Urine Culture: No results for input(s): LABURIN in the last 48 hours.  Urine Studies:   Recent Labs   Lab 09/06/19  1625   COLORU Yellow   APPEARANCEUA Hazy*   PHUR 6.0   SPECGRAV >=1.030*   PROTEINUA 1+*   GLUCUA Negative   KETONESU Negative   BILIRUBINUA 1+*   OCCULTUA 1+*   NITRITE Negative   UROBILINOGEN 2.0-3.0*   LEUKOCYTESUR Negative   RBCUA 8*   WBCUA 4   BACTERIA Many*   SQUAMEPITHEL 3   HYALINECASTS 0       Assessment/Plan:     89 y.o. 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     Active Diagnoses:    Diagnosis Date Noted POA    PRINCIPAL PROBLEM:  NSTEMI (non-ST elevated myocardial infarction) [I21.4] 09/06/2019 Yes      Problems Resolved During this Admission:     NEURO-PSYCH:     History of stroke  Does not speak much  Stroke deficit with paralysis of right side and dysarthria  Speech study      CV:     NSTEMI  Trend trops x 3 and EKG x 3  ASA  Plavix   Nitro sublingual prn  Holding Beta blocker due to hypotension   Holding ACE due to hypotension   Anticoagulation heparin  Atorvastatin  Cardiology Consulted   CXR enlarged heart and Right PNA     CHF  BNP elevated  Last echo 2015 EF 65%  Echo ordered - results w/ EF of 55%  Giving fluids for sepsis can always diuresis if needed later  Talked with caregiver who wants DNR at this time      Afib  Pacemaker in place  Pulse WNL  Holding BB due to hypotension     HLD  Statin      PULM:     PNA  CXR showed right middle and lower PNA   Azithromycin and rocephin   Cultures  LA WNL      ID  PNA  F/u cultures  Rocephin and azithromycin     UTI  Treating  Culture f/u      RENAL:  IVFs   Strict I&Os  Avoid renal toxic meds    FEN-GI:  Cardiac diet  IV fluids  Keep Mg 2, K 4     ENDO:  SSI    CODE: DNR    VTE Risk Mitigation (From admission, onward)        Ordered     heparin 25,000 units in dextrose 5% (100 units/ml) IV bolus from bag INITIAL BOLUS (max bolus 4000 units)  Once      09/06/19 2014     heparin 25,000 units in dextrose 5% 250 mL (100 units/mL) infusion LOW INTENSITY nomogram - [facility]  Continuous      09/06/19 2014     heparin 25,000 units in dextrose 5% (100 units/ml) IV bolus from bag - ADDITIONAL PRN BOLUS - 60 units/kg (max bolus 4000 units)  As needed (PRN)      09/06/19 2014     heparin 25,000 units in dextrose 5% (100 units/ml) IV bolus from bag -  ADDITIONAL PRN BOLUS - 30 units/kg (max bolus 4000 units)  As needed (PRN)      09/06/19 2014     IP VTE HIGH RISK PATIENT  Once      09/06/19 2014     Place sequential compression device  Until discontinued      09/06/19 2014          Yaima Toribio MD   Family Medicine, PGY-1  [facility]

## 2019-09-08 LAB
ALBUMIN SERPL BCP-MCNC: 2.9 G/DL (ref 3.5–5.2)
ALP SERPL-CCNC: 82 U/L (ref 55–135)
ALT SERPL W/O P-5'-P-CCNC: 18 U/L (ref 10–44)
ANION GAP SERPL CALC-SCNC: 9 MMOL/L (ref 8–16)
AST SERPL-CCNC: 29 U/L (ref 10–40)
BASOPHILS # BLD AUTO: 0.02 K/UL (ref 0–0.2)
BASOPHILS NFR BLD: 0.3 % (ref 0–1.9)
BILIRUB SERPL-MCNC: 0.5 MG/DL (ref 0.1–1)
BUN SERPL-MCNC: 37 MG/DL (ref 8–23)
CALCIUM SERPL-MCNC: 9.9 MG/DL (ref 8.7–10.5)
CHLORIDE SERPL-SCNC: 102 MMOL/L (ref 95–110)
CO2 SERPL-SCNC: 27 MMOL/L (ref 23–29)
CREAT SERPL-MCNC: 1.4 MG/DL (ref 0.5–1.4)
DIFFERENTIAL METHOD: ABNORMAL
EOSINOPHIL # BLD AUTO: 0 K/UL (ref 0–0.5)
EOSINOPHIL NFR BLD: 0.3 % (ref 0–8)
ERYTHROCYTE [DISTWIDTH] IN BLOOD BY AUTOMATED COUNT: 14.4 % (ref 11.5–14.5)
EST. GFR  (AFRICAN AMERICAN): 38 ML/MIN/1.73 M^2
EST. GFR  (NON AFRICAN AMERICAN): 33 ML/MIN/1.73 M^2
GLUCOSE SERPL-MCNC: 115 MG/DL (ref 70–110)
HCT VFR BLD AUTO: 37.3 % (ref 37–48.5)
HGB BLD-MCNC: 11.5 G/DL (ref 12–16)
LYMPHOCYTES # BLD AUTO: 0.8 K/UL (ref 1–4.8)
LYMPHOCYTES NFR BLD: 10.8 % (ref 18–48)
MAGNESIUM SERPL-MCNC: 1.8 MG/DL (ref 1.6–2.6)
MCH RBC QN AUTO: 30.2 PG (ref 27–31)
MCHC RBC AUTO-ENTMCNC: 30.8 G/DL (ref 32–36)
MCV RBC AUTO: 98 FL (ref 82–98)
MONOCYTES # BLD AUTO: 0.8 K/UL (ref 0.3–1)
MONOCYTES NFR BLD: 10.2 % (ref 4–15)
NEUTROPHILS # BLD AUTO: 5.9 K/UL (ref 1.8–7.7)
NEUTROPHILS NFR BLD: 78.4 % (ref 38–73)
PHOSPHATE SERPL-MCNC: 4.6 MG/DL (ref 2.7–4.5)
PLATELET # BLD AUTO: 231 K/UL (ref 150–350)
PLATELET BLD QL SMEAR: ABNORMAL
PMV BLD AUTO: 10.4 FL (ref 9.2–12.9)
POTASSIUM SERPL-SCNC: 5.1 MMOL/L (ref 3.5–5.1)
PROT SERPL-MCNC: 6.5 G/DL (ref 6–8.4)
RBC # BLD AUTO: 3.81 M/UL (ref 4–5.4)
SODIUM SERPL-SCNC: 138 MMOL/L (ref 136–145)
WBC # BLD AUTO: 7.78 K/UL (ref 3.9–12.7)

## 2019-09-08 PROCEDURE — 36415 COLL VENOUS BLD VENIPUNCTURE: CPT

## 2019-09-08 PROCEDURE — 27000221 HC OXYGEN, UP TO 24 HOURS

## 2019-09-08 PROCEDURE — 63600175 PHARM REV CODE 636 W HCPCS: Performed by: STUDENT IN AN ORGANIZED HEALTH CARE EDUCATION/TRAINING PROGRAM

## 2019-09-08 PROCEDURE — 84100 ASSAY OF PHOSPHORUS: CPT

## 2019-09-08 PROCEDURE — 83735 ASSAY OF MAGNESIUM: CPT

## 2019-09-08 PROCEDURE — 11000001 HC ACUTE MED/SURG PRIVATE ROOM

## 2019-09-08 PROCEDURE — 99900038 HC OT GENERIC THERAPY SCREENING (STAT)

## 2019-09-08 PROCEDURE — 94761 N-INVAS EAR/PLS OXIMETRY MLT: CPT

## 2019-09-08 PROCEDURE — 25000003 PHARM REV CODE 250: Performed by: STUDENT IN AN ORGANIZED HEALTH CARE EDUCATION/TRAINING PROGRAM

## 2019-09-08 PROCEDURE — 85025 COMPLETE CBC W/AUTO DIFF WBC: CPT

## 2019-09-08 PROCEDURE — 80053 COMPREHEN METABOLIC PANEL: CPT

## 2019-09-08 RX ORDER — MICONAZOLE NITRATE 2 %
POWDER (GRAM) TOPICAL 2 TIMES DAILY
Status: DISCONTINUED | OUTPATIENT
Start: 2019-09-08 | End: 2019-09-09 | Stop reason: HOSPADM

## 2019-09-08 RX ORDER — DEXTROSE MONOHYDRATE AND SODIUM CHLORIDE 5; .9 G/100ML; G/100ML
INJECTION, SOLUTION INTRAVENOUS CONTINUOUS
Status: ACTIVE | OUTPATIENT
Start: 2019-09-08 | End: 2019-09-09

## 2019-09-08 RX ADMIN — AZITHROMYCIN MONOHYDRATE 500 MG: 500 INJECTION, POWDER, LYOPHILIZED, FOR SOLUTION INTRAVENOUS at 04:09

## 2019-09-08 RX ADMIN — APIXABAN 2.5 MG: 2.5 TABLET, FILM COATED ORAL at 09:09

## 2019-09-08 RX ADMIN — CLOPIDOGREL BISULFATE 75 MG: 75 TABLET ORAL at 09:09

## 2019-09-08 RX ADMIN — PANTOPRAZOLE SODIUM 40 MG: 40 TABLET, DELAYED RELEASE ORAL at 09:09

## 2019-09-08 RX ADMIN — CEFTRIAXONE SODIUM 1 G: 1 INJECTION, POWDER, FOR SOLUTION INTRAMUSCULAR; INTRAVENOUS at 05:09

## 2019-09-08 RX ADMIN — DEXTROSE AND SODIUM CHLORIDE: 5; .9 INJECTION, SOLUTION INTRAVENOUS at 04:09

## 2019-09-08 RX ADMIN — CEFTRIAXONE SODIUM 1 G: 1 INJECTION, POWDER, FOR SOLUTION INTRAMUSCULAR; INTRAVENOUS at 04:09

## 2019-09-08 RX ADMIN — MICONAZOLE NITRATE: 20 POWDER TOPICAL at 12:09

## 2019-09-08 RX ADMIN — ATORVASTATIN CALCIUM 40 MG: 40 TABLET, FILM COATED ORAL at 09:09

## 2019-09-08 NOTE — SUBJECTIVE & OBJECTIVE
Interval History:  No acute events overnight. Blood cultures on 9/7 demonstrated both gram + and gram - rods, patient R pneumonia being treated with azithromycin and rocephin. Patient d/c heparin, continue eliquis and plavix. Recent Echo demonstrated EF of 55, with RA/LA enlargement. Patient DNR awaiting attending signature.     Review of Systems   Unable to perform ROS: Dementia     Objective:     Vital Signs (Most Recent):  Temp: 96 °F (35.6 °C) (09/08/19 0500)  Pulse: 105 (09/08/19 0500)  Resp: 16 (09/08/19 0500)  BP: (!) 120/59 (09/08/19 0500)  SpO2: 96 % (09/08/19 0428) Vital Signs (24h Range):  Temp:  [96 °F (35.6 °C)-98.2 °F (36.8 °C)] 96 °F (35.6 °C)  Pulse:  [] 105  Resp:  [16-18] 16  SpO2:  [93 %-96 %] 96 %  BP: (100-135)/(57-80) 120/59     Weight: 66.7 kg (147 lb 0.8 oz)  Body mass index is 27.78 kg/m².    Intake/Output Summary (Last 24 hours) at 9/8/2019 0752  Last data filed at 9/7/2019 2102  Gross per 24 hour   Intake --   Output 432 ml   Net -432 ml      Physical Exam   Constitutional: She appears well-developed. No distress.   HENT:   Head: Normocephalic and atraumatic.   Right Ear: External ear normal.   Left Ear: External ear normal.   Cardiovascular: Normal rate, regular rhythm and normal heart sounds. Exam reveals no gallop and no friction rub.   No murmur heard.  Pulmonary/Chest: Effort normal. No stridor. No respiratory distress. She has wheezes. She has no rales. She exhibits no tenderness.   Abdominal: Soft. She exhibits no distension and no mass. There is no tenderness. There is no rebound and no guarding. No hernia.   Musculoskeletal: She exhibits edema.   Skin: Skin is warm and dry. No rash noted. She is not diaphoretic. No erythema. No pallor.       Significant Labs:   Recent Lab Results       09/08/19  0523   09/07/19  1139   09/07/19  0925   09/07/19  0800        Albumin 2.9           Alkaline Phosphatase 82           ALT 18           Anion Gap 9           Ao root annulus     3.00        Ao peak avi     1.14       Ao VTI     23.74       AST 29           AV valve area     1.64       AV mean gradient     4       AV index (prosthetic)     0.53       AV peak gradient     5       AV Velocity Ratio     0.53       BILIRUBIN TOTAL 0.5  Comment:  For infants and newborns, interpretation of results should be based  on gestational age, weight and in agreement with clinical  observations.  Premature Infant recommended reference ranges:  Up to 24 hours.............<8.0 mg/dL  Up to 48 hours............<12.0 mg/dL  3-5 days..................<15.0 mg/dL  6-29 days.................<15.0 mg/dL             BSA     1.67       BUN, Bld 37           Calcium 9.9           Chloride 102           CO2 27           Creatinine 1.4           Left Ventricle Relative Wall Thickness     0.49       eGFR if  38           eGFR if non  33  Comment:  Calculation used to obtain the estimated glomerular filtration  rate (eGFR) is the CKD-EPI equation.              FS     53       Glucose 115           Hematocrit 37.3           Hemoglobin 11.5           IVS     0.88       LA WIDTH     4.57       Left Atrium Major Axis     6.21       Left Atrium Minor Axis     6.40       LA size     5.10       LA volume     124.88       LA Volume Index     75.3       LVOT area     3.1       LV Diastolic Volume     62.05       LV Diastolic Volume Index     37.43       LVIDD     3.80       LVIDS     1.80       LV mass     101.84       LV Mass Index     61       LVOT diameter     1.99       LVOT peak avi     0.60       LVOT stroke volume     38.95       LVOT peak VTI     12.53       LV Systolic Volume     26.53       LV Systolic Volume Index     16.0       Magnesium 1.8           MCH 30.2           MCHC 30.8           MCV 98           MPV 10.4           Phosphorus 4.6           Platelets 231           POCT Glucose   143         Potassium 5.1           PROTEIN TOTAL 6.5           PV Peak D Avi     0.32       PV Peak S Avi      0.22       Pulm vein S/D ratio     0.69       PW     0.93       Right Atrial Pressure (from IVC)     3       RA Major Axis     6.60       RA Width     5.40       RBC 3.81           RDW 14.4           RIGHT VENTRICLE FREE WALL THICKNESS     5.00       RVDD     2.90       Right ventricular length in diastole (apical 4-chamber view)     1.80       Sodium 138           Triscuspid Valve Regurgitation Peak Gradient     41       TR Max Avi     3.19       Troponin I       0.018  Comment:  The reference interval for Troponin I represents the 99th percentile   cutoff   for our facility and is consistent with 3rd generation assay   performance.       TV rest pulmonary artery pressure     44       WBC 7.78                 Significant Imaging: I have reviewed and interpreted all pertinent imaging results/findings within the past 24 hours.

## 2019-09-08 NOTE — PT/OT/SLP PROGRESS
"Occupational Therapy  Screen and D/c     Patient Name:  Mattie Belle   MRN:  [id number]    Patient seen for screen this date. Increased lethargy noted from medication, however, dghtr present to provide information regarding PLOF.     Pt lives with son in [place], ramp entrance, WIS with grab bars and shower chair. Pt also has access to w/c, BSC, and hospital bed. Pt is nonambulatory at baseline, but able to self propel w/c. Pt does not currently utilize hospital bed and/or BSC; sleeps in a recliner. Dghtr reports that pt requires assist with all t/fs and ADLs and that she is only left alone at home for very brief time. Home is w/c accessible and set up with "nursing call system" throughout the house. Dghtr states no concerns regarding care for her mother at this time as they have cared for her since her CVA. Dghtr did inquire about w/c position supports and neck support for pt as she is concerned with aspiration due to poor posture. Education provided on pillow supports, lap tray, positioning to decrease aspiration risk. Encouraged dghtr to increase t/fs at home throughout the day to provide pt with "exercise" to maintain abilities. Dghtr reports that she t/f'd pt to b/s chair yesterday's date and pt tolerated for 2 hours. Educated on use of sweetie lift in future if t/fs become too difficult.     Pt appears to be performing at baseline pt dghtr's report. No further OT services required at this time. D/c OT     Shameka Carreon OT  9/8/2019  "

## 2019-09-08 NOTE — PLAN OF CARE
Problem: Adult Inpatient Plan of Care  Goal: Plan of Care Review  Outcome: Ongoing (interventions implemented as appropriate)     09/07/19 1730   Plan of Care Review   Plan of Care Reviewed With patient;daughter   Plan of care reviewed with patient. Verbal reported of understanding. Paced rhythm on monitor with no red alarms noted. Free of falls. Blood glucose closely monitored. Heparin drip stopped per ordered. Antibiotics given as ordered. No complaint of pain throughout the shift. Daughter at bedside. No acute distress noted. Bed in lowest position. Wheels locked. Head of bed elevated. Side rails x 2 are up. Call bell within reach. Patient will be monitored overnight.

## 2019-09-08 NOTE — PLAN OF CARE
VN note: VN cued into patient's room for introduction. Patient asleep at this time, daughter at bedside. VN informed daughter that VN would be working closely along side bedside nurse, PCT, and the rest of care team and making rounds throughout the shift. Daughter verbalized understanding. Allowed time for questions. VN will continue to be available to patient and intervene prn.       09/08/19 1533   Type of Frequent Check   Type Patient Rounds;Other (see comments)  (VN Rounds)   Safety/Activity   Patient Rounds bed in low position;visualized patient   Safety Promotion/Fall Prevention side rails raised x 2;family to remain at bedside   Activity Management activity adjusted per tolerance   Positioning   Body Position supine   Head of Bed (HOB) HOB at 30-45 degrees   Assessments (Pre/Post)   Level of Consciousness (AVPU)   (Patient asleep at this time.)

## 2019-09-08 NOTE — PLAN OF CARE
Patient AAOx3. . Patient denies any chest pain or SOB.  No distress noted. Patient on 1LNC. BBS clear. Abdomen soft non tender. Skin clean dry and intact. Bed alarm in place. Call light within reach. Daughter at bedside. Bed alarm in place

## 2019-09-08 NOTE — PROGRESS NOTES
[facility] Hospital Medicine  Progress Note    Patient Name: Mattie Belle  MRN: [id number]  Patient Class: IP- Inpatient   Admission Date: 9/6/2019  Length of Stay: 2 days  Attending Physician: Vimal Miner MD  Primary Care Provider: Yunior Pagan MD        Subjective:     Principal Problem:NSTEMI (non-ST elevated myocardial infarction)        HPI:  89 y.o. 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 Family member provides whole history and patient is sleeping and nonverbal. Caregiver notes she lives with her brother and is in a wheelchair and does not walk or speak much due to her past stroke. For the past few days she was feeling tired and had an episode of nausea with one vomiting incidence. She did not complain of CP but does not speak much. She can understand her family when spoken to. No fevers, cough, diarrhea or other symptoms per family. She went to an OSH and CXR showed PNA and NSTEMI with an EKG no ST changes, trop elevated at 0.036, and an elevated BNP. Last echo was in 2015 with normal EF. No recent echo. She saw a cardiologist around a year ago. Cardiology was called and recommended a heparin drip and admission. She was started on abx for a PNA and UTI. Family member notes she is DNR.     Interval History:  No acute events overnight. Blood cultures on 9/7 demonstrated both gram + and gram - rods, patient R pneumonia being treated with azithromycin and rocephin. Patient d/c heparin, continue eliquis and plavix. Recent Echo demonstrated EF of 55, with RA/LA enlargement. Patient DNR awaiting attending signature.     Review of Systems   Unable to perform ROS: Dementia     Objective:     Vital Signs (Most Recent):  Temp: 96 °F (35.6 °C) (09/08/19 0500)  Pulse: 105 (09/08/19 0500)  Resp: 16  (09/08/19 0500)  BP: (!) 120/59 (09/08/19 0500)  SpO2: 96 % (09/08/19 0428) Vital Signs (24h Range):  Temp:  [96 °F (35.6 °C)-98.2 °F (36.8 °C)] 96 °F (35.6 °C)  Pulse:  [] 105  Resp:  [16-18] 16  SpO2:  [93 %-96 %] 96 %  BP: (100-135)/(57-80) 120/59     Weight: 66.7 kg (147 lb 0.8 oz)  Body mass index is 27.78 kg/m².    Intake/Output Summary (Last 24 hours) at 9/8/2019 0752  Last data filed at 9/7/2019 2102  Gross per 24 hour   Intake --   Output 432 ml   Net -432 ml      Physical Exam   Constitutional: She appears well-developed. No distress.   HENT:   Head: Normocephalic and atraumatic.   Right Ear: External ear normal.   Left Ear: External ear normal.   Cardiovascular: Normal rate, regular rhythm and normal heart sounds. Exam reveals no gallop and no friction rub.   No murmur heard.  Pulmonary/Chest: Effort normal. No stridor. No respiratory distress. She has wheezes. She has no rales. She exhibits no tenderness.   Abdominal: Soft. She exhibits no distension and no mass. There is no tenderness. There is no rebound and no guarding. No hernia.   Musculoskeletal: She exhibits edema.   Skin: Skin is warm and dry. No rash noted. She is not diaphoretic. No erythema. No pallor.       Significant Labs:   Recent Lab Results       09/08/19  0523   09/07/19  1139   09/07/19  0925   09/07/19  0800        Albumin 2.9           Alkaline Phosphatase 82           ALT 18           Anion Gap 9           Ao root annulus     3.00       Ao peak enzo     1.14       Ao VTI     23.74       AST 29           AV valve area     1.64       AV mean gradient     4       AV index (prosthetic)     0.53       AV peak gradient     5       AV Velocity Ratio     0.53       BILIRUBIN TOTAL 0.5  Comment:  For infants and newborns, interpretation of results should be based  on gestational age, weight and in agreement with clinical  observations.  Premature Infant recommended reference ranges:  Up to 24 hours.............<8.0 mg/dL  Up to 48  hours............<12.0 mg/dL  3-5 days..................<15.0 mg/dL  6-29 days.................<15.0 mg/dL             BSA     1.67       BUN, Bld 37           Calcium 9.9           Chloride 102           CO2 27           Creatinine 1.4           Left Ventricle Relative Wall Thickness     0.49       eGFR if  38           eGFR if non  33  Comment:  Calculation used to obtain the estimated glomerular filtration  rate (eGFR) is the CKD-EPI equation.              FS     53       Glucose 115           Hematocrit 37.3           Hemoglobin 11.5           IVS     0.88       LA WIDTH     4.57       Left Atrium Major Axis     6.21       Left Atrium Minor Axis     6.40       LA size     5.10       LA volume     124.88       LA Volume Index     75.3       LVOT area     3.1       LV Diastolic Volume     62.05       LV Diastolic Volume Index     37.43       LVIDD     3.80       LVIDS     1.80       LV mass     101.84       LV Mass Index     61       LVOT diameter     1.99       LVOT peak avi     0.60       LVOT stroke volume     38.95       LVOT peak VTI     12.53       LV Systolic Volume     26.53       LV Systolic Volume Index     16.0       Magnesium 1.8           MCH 30.2           MCHC 30.8           MCV 98           MPV 10.4           Phosphorus 4.6           Platelets 231           POCT Glucose   143         Potassium 5.1           PROTEIN TOTAL 6.5           PV Peak D Avi     0.32       PV Peak S Avi     0.22       Pulm vein S/D ratio     0.69       PW     0.93       Right Atrial Pressure (from IVC)     3       RA Major Axis     6.60       RA Width     5.40       RBC 3.81           RDW 14.4           RIGHT VENTRICLE FREE WALL THICKNESS     5.00       RVDD     2.90       Right ventricular length in diastole (apical 4-chamber view)     1.80       Sodium 138           Triscuspid Valve Regurgitation Peak Gradient     41       TR Max Avi     3.19       Troponin I       0.018  Comment:  The  reference interval for Troponin I represents the 99th percentile   cutoff   for our facility and is consistent with 3rd generation assay   performance.       TV rest pulmonary artery pressure     44       WBC 7.78                 Significant Imaging: I have reviewed and interpreted all pertinent imaging results/findings within the past 24 hours.      Assessment/Plan:      89 y.o. female with a pertinent PMH of Afib on apixaban and plavix, CHB s/p PPM, diastolic heart failure (last echo EF 60% 2015), HTN, HLD, and stroke with residual right sided paralysis and dysarthria presents for evaluation and treatment of sepsis 2/2 to right lung PNA, UTI, and NSTEMI with suspected heart failure.            Active Diagnoses:     Diagnosis Date Noted POA    PRINCIPAL PROBLEM:  NSTEMI (non-ST elevated myocardial infarction) [I21.4] 09/06/2019 Yes       Problems Resolved During this Admission:      NEURO-PSYCH:     History of stroke  Does not speak much  Stroke deficit with paralysis of right side and dysarthria  Speech study      CV:     NSTEMI  Trend trops x 3 and EKG x 3  ASA  Plavix   Nitro sublingual prn  Holding Beta blocker due to hypotension   Holding ACE due to hypotension   Anticoagulation heparin  Atorvastatin  Cardiology Consulted   CXR enlarged heart and Right PNA     CHF  BNP elevated  Last echo 2015 EF 65%  Echo ordered - results w/ EF of 55%  TTE demonstrated EF 55%, LA and RA enlargement, severe tricuspid regurge  Giving fluids for sepsis can always diuresis if needed later  Talked with caregiver who wants DNR at this time , pending signature     Afib  Pacemaker in place  Pulse WNL  Holding BB due to hypotension     HLD  Statin      PULM:     PNA  CXR showed right middle and lower PNA   Azithromycin and rocephin   Cultures  LA WNL      ID  PNA  F/u cultures  Rocephin and azithromycin     UTI  Treating  Culture f/u , RESULTS PENDING     RENAL:  IVFs   Strict I&Os  Avoid renal toxic meds     FEN-GI:  Cardiac  diet  IV fluids  Keep Mg 2, K 4     ENDO:  SSI     CODE: DNR    VTE Risk Mitigation (From admission, onward)        Ordered     apixaban tablet 2.5 mg  2 times daily      09/07/19 1548     IP VTE HIGH RISK PATIENT  Once      09/06/19 2014     Place sequential compression device  Until discontinued      09/06/19 2014                Whitney Fried MD  Department of Hospital Medicine   [facility]

## 2019-09-08 NOTE — PT/OT/SLP PROGRESS
Occupational Therapy  Visit Attempt     Patient Name:  Mattie Belle   MRN:  [id number]    Patient not seen this AM 2/2 meeting with MDs at attempts. Will attempt again as available    Shameka Carreon OT  9/8/2019

## 2019-09-09 ENCOUNTER — TELEPHONE (OUTPATIENT)
Dept: PHARMACY | Facility: CLINIC | Age: 84
End: 2019-09-09

## 2019-09-09 VITALS
OXYGEN SATURATION: 90 % | RESPIRATION RATE: 18 BRPM | WEIGHT: 147.06 LBS | SYSTOLIC BLOOD PRESSURE: 123 MMHG | HEART RATE: 107 BPM | TEMPERATURE: 97 F | HEIGHT: 61 IN | BODY MASS INDEX: 27.77 KG/M2 | DIASTOLIC BLOOD PRESSURE: 60 MMHG

## 2019-09-09 PROBLEM — N30.00 ACUTE CYSTITIS WITHOUT HEMATURIA: Status: ACTIVE | Noted: 2019-09-09

## 2019-09-09 LAB
ALBUMIN SERPL BCP-MCNC: 2.6 G/DL (ref 3.5–5.2)
ALP SERPL-CCNC: 79 U/L (ref 55–135)
ALT SERPL W/O P-5'-P-CCNC: 17 U/L (ref 10–44)
ANION GAP SERPL CALC-SCNC: 10 MMOL/L (ref 8–16)
AST SERPL-CCNC: 28 U/L (ref 10–40)
BACTERIA UR CULT: ABNORMAL
BASOPHILS # BLD AUTO: 0.02 K/UL (ref 0–0.2)
BASOPHILS NFR BLD: 0.3 % (ref 0–1.9)
BILIRUB SERPL-MCNC: 0.4 MG/DL (ref 0.1–1)
BUN SERPL-MCNC: 43 MG/DL (ref 8–23)
CALCIUM SERPL-MCNC: 9.7 MG/DL (ref 8.7–10.5)
CHLORIDE SERPL-SCNC: 104 MMOL/L (ref 95–110)
CO2 SERPL-SCNC: 25 MMOL/L (ref 23–29)
CREAT SERPL-MCNC: 1.3 MG/DL (ref 0.5–1.4)
DIFFERENTIAL METHOD: ABNORMAL
EOSINOPHIL # BLD AUTO: 0 K/UL (ref 0–0.5)
EOSINOPHIL NFR BLD: 0.3 % (ref 0–8)
ERYTHROCYTE [DISTWIDTH] IN BLOOD BY AUTOMATED COUNT: 14.2 % (ref 11.5–14.5)
EST. GFR  (AFRICAN AMERICAN): 42 ML/MIN/1.73 M^2
EST. GFR  (NON AFRICAN AMERICAN): 36 ML/MIN/1.73 M^2
GLUCOSE SERPL-MCNC: 162 MG/DL (ref 70–110)
HCT VFR BLD AUTO: 35.2 % (ref 37–48.5)
HGB BLD-MCNC: 10.8 G/DL (ref 12–16)
LYMPHOCYTES # BLD AUTO: 1 K/UL (ref 1–4.8)
LYMPHOCYTES NFR BLD: 12.6 % (ref 18–48)
MAGNESIUM SERPL-MCNC: 2 MG/DL (ref 1.6–2.6)
MCH RBC QN AUTO: 29.3 PG (ref 27–31)
MCHC RBC AUTO-ENTMCNC: 30.7 G/DL (ref 32–36)
MCV RBC AUTO: 95 FL (ref 82–98)
MONOCYTES # BLD AUTO: 1.2 K/UL (ref 0.3–1)
MONOCYTES NFR BLD: 15.7 % (ref 4–15)
NEUTROPHILS # BLD AUTO: 5.1 K/UL (ref 1.8–7.7)
NEUTROPHILS NFR BLD: 71.1 % (ref 38–73)
PHOSPHATE SERPL-MCNC: 3.8 MG/DL (ref 2.7–4.5)
PLATELET # BLD AUTO: 208 K/UL (ref 150–350)
PLATELET BLD QL SMEAR: ABNORMAL
PMV BLD AUTO: 10.4 FL (ref 9.2–12.9)
POTASSIUM SERPL-SCNC: 5 MMOL/L (ref 3.5–5.1)
PROT SERPL-MCNC: 6 G/DL (ref 6–8.4)
RBC # BLD AUTO: 3.69 M/UL (ref 4–5.4)
SODIUM SERPL-SCNC: 139 MMOL/L (ref 136–145)
WBC # BLD AUTO: 7.56 K/UL (ref 3.9–12.7)

## 2019-09-09 PROCEDURE — 36415 COLL VENOUS BLD VENIPUNCTURE: CPT

## 2019-09-09 PROCEDURE — 85025 COMPLETE CBC W/AUTO DIFF WBC: CPT

## 2019-09-09 PROCEDURE — 80053 COMPREHEN METABOLIC PANEL: CPT

## 2019-09-09 PROCEDURE — 84100 ASSAY OF PHOSPHORUS: CPT

## 2019-09-09 PROCEDURE — 83735 ASSAY OF MAGNESIUM: CPT

## 2019-09-09 PROCEDURE — 99900035 HC TECH TIME PER 15 MIN (STAT)

## 2019-09-09 PROCEDURE — 25000003 PHARM REV CODE 250: Performed by: STUDENT IN AN ORGANIZED HEALTH CARE EDUCATION/TRAINING PROGRAM

## 2019-09-09 PROCEDURE — 63600175 PHARM REV CODE 636 W HCPCS: Performed by: STUDENT IN AN ORGANIZED HEALTH CARE EDUCATION/TRAINING PROGRAM

## 2019-09-09 RX ORDER — AZITHROMYCIN 250 MG/1
500 TABLET, FILM COATED ORAL DAILY
Qty: 4 TABLET | Refills: 0 | Status: SHIPPED | OUTPATIENT
Start: 2019-09-09 | End: 2019-09-09 | Stop reason: SDUPTHER

## 2019-09-09 RX ORDER — LEVOFLOXACIN 750 MG/1
750 TABLET ORAL DAILY
Qty: 2 TABLET | Refills: 0 | Status: SHIPPED | OUTPATIENT
Start: 2019-09-09 | End: 2019-09-09 | Stop reason: SDUPTHER

## 2019-09-09 RX ORDER — PANTOPRAZOLE SODIUM 40 MG/1
TABLET, DELAYED RELEASE ORAL
Qty: 90 TABLET | Refills: 4 | Status: SHIPPED | OUTPATIENT
Start: 2019-09-09

## 2019-09-09 RX ORDER — IPRATROPIUM BROMIDE AND ALBUTEROL SULFATE 2.5; .5 MG/3ML; MG/3ML
3 SOLUTION RESPIRATORY (INHALATION) EVERY 8 HOURS
Status: DISCONTINUED | OUTPATIENT
Start: 2019-09-09 | End: 2019-09-09 | Stop reason: HOSPADM

## 2019-09-09 RX ORDER — LEVOFLOXACIN 750 MG/1
750 TABLET ORAL DAILY
Qty: 2 TABLET | Refills: 0 | Status: SHIPPED | OUTPATIENT
Start: 2019-09-09 | End: 2019-09-11

## 2019-09-09 RX ORDER — HYDROXYZINE HYDROCHLORIDE 10 MG/1
10 TABLET, FILM COATED ORAL DAILY PRN
Status: COMPLETED | OUTPATIENT
Start: 2019-09-09 | End: 2019-09-09

## 2019-09-09 RX ORDER — AZITHROMYCIN 250 MG/1
500 TABLET, FILM COATED ORAL DAILY
Qty: 4 TABLET | Refills: 0 | Status: SHIPPED | OUTPATIENT
Start: 2019-09-09 | End: 2019-09-11

## 2019-09-09 RX ORDER — CARVEDILOL 3.12 MG/1
3.12 TABLET ORAL 2 TIMES DAILY
Status: DISCONTINUED | OUTPATIENT
Start: 2019-09-09 | End: 2019-09-09 | Stop reason: HOSPADM

## 2019-09-09 RX ADMIN — MORPHINE SULFATE 2 MG: 4 INJECTION INTRAVENOUS at 03:09

## 2019-09-09 RX ADMIN — CEFTRIAXONE SODIUM 1 G: 1 INJECTION, POWDER, FOR SOLUTION INTRAMUSCULAR; INTRAVENOUS at 06:09

## 2019-09-09 RX ADMIN — MICONAZOLE NITRATE: 20 POWDER TOPICAL at 09:09

## 2019-09-09 RX ADMIN — HYDROXYZINE HYDROCHLORIDE 10 MG: 10 TABLET, FILM COATED ORAL at 04:09

## 2019-09-09 NOTE — HOSPITAL COURSE
09/09: NAEON. Urine from 9/7 grew 10-50k CFU gram negative rods. Urine gram stain shows gram positive and gram negative rods. UC sensitivity susceptible to ceftriaxone. BCx. No growth x 2 days. TTE shows EF 55%. PHTN. Biatrial enlargment and severe tricuspid regurg. Coreg restart at 3.125mg BID for rate control. F/u CM for Hospice/Palliative Care.      9/8PM: Pt was given Ativan for agitation, and has been very difficult to arouse during the day. VSS started on maintenance IVF 75 cc's/hr for 12 hrs   9/8AM: NAEON. Patient DNR awaiting attending signature.    9/7PM: R PNA on azithro/rocephin, f/u cultures; stopped heparin, continued eliquis/plavix; Echo w/ EFSS, severe RA/LA enlargement; agitated in PM, ordered Ativan 0.5 mg PRN.  9/7AM: Gave small bolus fluids for sepsis 2/2 R PNA also has CHF with nstemi. F/u cards, echo. On heparin drip. DNR. Montior bp.

## 2019-09-09 NOTE — CONSULTS
"RD consulted for "not eating and malnourished". Spoke with daughter in room. Pt has decided to be d/c on hospice for comfort care. RD assessment no longer needed.  "

## 2019-09-09 NOTE — PLAN OF CARE
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     Allergies:Review of patient's allergies indicates:  No Known Allergies    Diet: cardiac diet    Activities: Activity as tolerated    Nursing: Per unit protocol    Labs: None       CONSULTS:     to evaluate for community resources/long-range planning.    MISCELLANEOUS CARE:  Routine Skin for Bedridden Patients: Apply moisture barrier cream to all skin folds and wet areas in perineal area daily and after baths and all bowel movements.   Pt is currently on 1L O2 NC - continue upon transfer, receiving treatment team may adjust as necessary.    WOUND CARE ORDERS  None    Medications: Review discharge medications with patient and family and provide education.      Current Discharge Medication List      START taking these medications    Details   azithromycin (Z-LAZ) 250 MG tablet  Take 2 tablets (500 mg total) by mouth once daily. Take on 9/10/19 and 9/11/19. for 2 days  Qty: 4 tablet, Refills: 0      levoFLOXacin (LEVAQUIN) 750 MG tablet Take 1 tablet (750 mg total) by mouth once daily. Take on 9/10/19 and 9/11/19. for 2 doses  Qty: 2 tablet, Refills: 0         CONTINUE these medications which have NOT CHANGED    Details   atorvastatin (LIPITOR) 40 MG tablet TAKE 1 TABLET DAILY  Qty: 90 tablet, Refills: 4      C,E,zinc,copper 11/jihyd7o/lut (OCUVITE ADULT 50 PLUS ORAL) Take by mouth every evening.      carvedilol (COREG) 12.5 MG tablet TAKE 1 TABLET TWICE A DAY  Qty: 180 tablet, Refills: 4      cholecalciferol, vitamin D3, (VITAMIN D3) 5,000 unit Tab Take 1 capsule by mouth every evening.       clopidogrel (PLAVIX) 75 mg tablet Take 1 tablet (75 mg total) by mouth once daily.  Qty: 90 tablet, Refills: 3      ELIQUIS 2.5 mg Tab TAKE 1 TABLET TWICE A DAY  Qty: 180 tablet, Refills: 1      ferrous sulfate 325 (65 FE) MG EC tablet Take 325 mg by mouth 2 (two) times daily.      nystatin (NYSTOP) powder APPLY TO AFFECTED AREA THREE TIMES A DAY  Qty: 60 g, Refills: 0      ondansetron (ZOFRAN-ODT) 4 MG TbDL Take 4 mg by mouth every 8 (eight) hours as needed.      spironolactone (ALDACTONE) 25 MG tablet TAKE 1 TABLET DAILY  Qty: 90 tablet, Refills: 4    Associated Diagnoses: Elevated brain natriuretic peptide (BNP) level      nitroGLYCERIN 0.4 MG/DOSE TL SPRY (NITROLINGUAL) 400 mcg/spray spray Place 1 spray under the tongue every 5 (five) minutes as needed for Chest pain (max 3 doses). Max 3 doses  Qty: 4.9 g, Refills: 12      nystatin (MYCOSTATIN) cream Apply topically 2 (two) times daily.  Qty: 1 Tube, Refills: 0      pantoprazole (PROTONIX) 40 MG tablet TAKE 1 TABLET DAILY  Qty: 90 tablet, Refills: 4      sodium bicarbonate/sod citrat (HOWARD-SELTZER HEARTBURN ORAL) Take by mouth as needed.              _________________________________  Yaima Toribio MD   Family Medicine  PGY-1  09/09/2019

## 2019-09-09 NOTE — PLAN OF CARE
Patient lying in bed. Daughter at bedside. Patient states that she wants to die and go home. Daughter asks mother if she wants to stop care. Patient responds with yes. Daughter would like to talk case management about hospice. Placed consult for . Daughter states that she will talk to primary team in the morning as well.

## 2019-09-09 NOTE — DISCHARGE SUMMARY
[facility] Hospital Medicine  Discharge Summary      Patient Name: Mattie Belle  MRN: [id number]  Admission Date: 9/6/2019  Hospital Length of Stay: 3 days  Discharge Date and Time:  09/09/2019 2:36 PM  Attending Physician: Severyn Yaroshevsky, MD   Discharging Provider: Yaima Toribio MD  Primary Care Provider: Yunior Pagan MD    HPI:   89 y.o. female with a pertinent PMHx of Afib  (on apixaban and Plavix), CHB s/p PPM, diastolic heart failure (last echo EF 60% 2015),  HTN, HLD, and stroke (with residual right sided paralysis and dysarthria) presents to an OSH with AMS and nausea. Family member provides whole history - patient is sleeping and nonverbal. Caregiver notes she lives with her brother, is in a wheelchair, and does not walk or speak much due to her past stroke. For the past few days she was feeling tired and had an episode of nausea with one vomiting incidence. She did not complain of CP but does not speak much. She can understand her family when spoken to. No fevers, cough, diarrhea or other symptoms per family. She went to an OSH and CXR showed PNA and NSTEMI with an EKG no ST changes, trop elevated at 0.036, and an elevated BNP. Last echo was in 2015 with normal EF. No recent echo. She saw a cardiologist around a year ago. Cardiology was called and recommended a heparin drip and admission. She was started on Abx for a PNA and UTI. Family member notes she is DNR.     Hospital Course:   09/09: NAEON. Urine from 9/7 grew 10-50k CFU gram negative rods. Urine gram stain shows gram positive and gram negative rods. UC sensitivity susceptible to ceftriaxone. BCx. No growth x 2 days. TTE shows EF 55%. PHTN. Biatrial enlargment and severe tricuspid regurg. Coreg restart at 3.125mg BID for rate control. F/u CM for Hospice/Palliative Care.      9/8PM: Pt was given Ativan for agitation, and has been very difficult to arouse during the day. VSS started on maintenance IVF 75 cc's/hr for 12  hrs   9/8AM: NAEON. Patient DNR awaiting attending signature.    9/7PM: R PNA on azithro/rocephin, f/u cultures; stopped heparin, continued eliquis/plavix; Echo w/ EFSS, severe RA/LA enlargement; agitated in PM, ordered Ativan 0.5 mg PRN.  9/7AM: Gave small bolus fluids for sepsis 2/2 R PNA also has CHF with nstemi. F/u cards, echo. On heparin drip. DNR. Montior bp.      Consults:   Consults (From admission, onward)        Status Ordering Provider     Case Management  Once     Provider:  (Not yet assigned)    Completed SRIDHAR GRAHAM     [facility]  Once     Provider:  (Not yet assigned)    Completed SRIDHAR GRAHAM     Inpatient consult to Registered Dietitian/Nutritionist  Once     Provider:  (Not yet assigned)    Acknowledged SRIDHAR GRAHAM     Inpatient consult to Social Work  Once     Provider:  (Not yet assigned)    Acknowledged MALAIKA HODGE     Inpatient consult to Social Work  Once     Provider:  (Not yet assigned)    Acknowledged MIR JONES        Final Active Diagnoses:    Diagnosis Date Noted POA    PRINCIPAL PROBLEM:  NSTEMI (non-ST elevated myocardial infarction) [I21.4] 09/06/2019 Yes    Pneumonia [J18.9] 01/03/2016 Yes    Acute cystitis without hematuria [N30.00] 09/09/2019 Yes    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    Specialty:  Internal Medicine  Why:  Follow up  hospital stay  Contact information:  [address]  [facility] [address]  [phone]                 Patient Instructions:      Diet Cardiac     Notify your health care provider if you experience any of the following:  temperature >100.4     Notify your health care provider if you experience any of the following:  severe uncontrolled pain     Notify your health care provider if you experience any of the following:  persistent nausea and vomiting or diarrhea     Notify your health care provider if you experience any of the following:  difficulty breathing or increased cough     Notify your health care provider if you experience any of the following:  severe persistent headache     Notify your health care provider if you experience any of the following:  persistent dizziness, light-headedness, or visual disturbances     Notify your health care provider if you experience any of the following:  increased confusion or weakness     Activity as tolerated     Significant Diagnostic Studies::   Recent Labs   Lab 09/08/19 0523 09/09/19 0511   * 162*    139   K 5.1 5.0    104   CO2 27 25   BUN 37* 43*   CREATININE 1.4 1.3   CALCIUM 9.9 9.7   MG 1.8 2.0     Recent Labs   Lab 09/08/19 0523 09/09/19 0511    139   K 5.1 5.0    104   CO2 27 25   * 162*   BUN 37* 43*   CREATININE 1.4 1.3   CALCIUM 9.9 9.7   PROT 6.5 6.0   ALBUMIN 2.9* 2.6*   BILITOT 0.5 0.4   ALKPHOS 82 79   AST 29 28   ALT 18 17   ANIONGAP 9 10   ESTGFRAFRICA 38* 42*   EGFRNONAA 33* 36*     Recent Labs   Lab 09/08/19 0523 09/09/19 0511   WBC 7.78 7.56   HGB 11.5* 10.8*   HCT 37.3 35.2*    208     Lab Results   Component Value Date    INR 1.2 09/06/2019    INR 1.1 09/06/2019    INR 1.2 08/17/2018     Lab Results   Component Value Date    CHOL 86 (L) 08/26/2019    HDL 45 08/26/2019    LDLCALC 32.4 (L) 08/26/2019    TRIG 43 08/26/2019    CHOLHDL 52.3 (H) 08/26/2019     Recent Labs   Lab 09/07/19  0800   TROPONINI 0.018      Lab Results   Component Value Date    LABBLOO No Growth to date 09/06/2019    LABBLOO No Growth to date 09/06/2019    LABBLOO No Growth to date 09/06/2019    LABBLOO No Growth to date 09/06/2019    LABBLOO No Growth to date 09/06/2019    LABBLOO No Growth to date 09/06/2019       Lab Results   Component Value Date    LABURIN ESCHERICHIA COLI  10,000 - 49,999 cfu/ml   (A) 09/07/2019     Medications:  Reconciled Home Medications:      Medication List      START taking these medications    azithromycin 250 MG tablet  Commonly known as:  Z-LAZ  Take 2 tablets (500 mg total) by mouth once daily. for 2 days     levoFLOXacin 750 MG tablet  Commonly known as:  LEVAQUIN  Take 1 tablet (750 mg total) by mouth once daily. for 2 doses        CHANGE how you take these medications    spironolactone 25 MG tablet  Commonly known as:  ALDACTONE  TAKE 1 TABLET DAILY  What changed:    · how much to take  · how to take this  · when to take this  · additional instructions        CONTINUE taking these medications    HOWARD-SELTZER HEARTBURN ORAL  Take by mouth as needed.     atorvastatin 40 MG tablet  Commonly known as:  LIPITOR  TAKE 1 TABLET DAILY     carvedilol 12.5 MG tablet  Commonly known as:  COREG  TAKE 1 TABLET TWICE A DAY     clopidogrel 75 mg tablet  Commonly known as:  PLAVIX  Take 1 tablet (75 mg total) by mouth once daily.     ELIQUIS 2.5 mg Tab  Generic drug:  apixaban  TAKE 1 TABLET TWICE A DAY     ferrous sulfate 325 (65 FE) MG EC tablet  Take 325 mg by mouth 2 (two) times daily.     nitroGLYCERIN 0.4 MG/DOSE TL SPRY 400 mcg/spray spray  Commonly known as:  NITROLINGUAL  Place 1 spray under the tongue every 5 (five) minutes as needed for Chest pain (max 3 doses). Max 3 doses     * nystatin cream  Commonly known as:  MYCOSTATIN  Apply topically 2 (two) times daily.     * nystatin powder  Commonly known as:  NYSTOP  APPLY TO AFFECTED AREA THREE TIMES A DAY     OCUVITE ADULT 50 PLUS ORAL  Take by mouth every evening.      ondansetron 4 MG Tbdl  Commonly known as:  ZOFRAN-ODT  Take 4 mg by mouth every 8 (eight) hours as needed.     pantoprazole 40 MG tablet  Commonly known as:  PROTONIX  TAKE 1 TABLET DAILY     VITAMIN D3 5,000 unit Tab  Generic drug:  cholecalciferol (vitamin D3)  Take 1 capsule by mouth every evening.         * This list has 2 medication(s) that are the same as other medications prescribed for you. Read the directions carefully, and ask your doctor or other care provider to review them with you.                Indwelling Lines/Drains at time of discharge:   Lines/Drains/Airways          None          Time spent on the discharge of patient: 30 minutes  Patient was seen and examined on the date of discharge and determined to be suitable for discharge.         Yaima Toribio MD  Family Medicine, PGY-1  [facility]

## 2019-09-09 NOTE — PLAN OF CARE
Problem: Adult Inpatient Plan of Care  Goal: Plan of Care Review  Patient on oxygen with documented flow. Will attempt to wean per O2 order protocol.

## 2019-09-09 NOTE — PLAN OF CARE
Patient talking at this time  Information received from daughter Radha Howard at bedside [phone]  DME at home includes hospital bed, wheelchair, BSC    Patient lives at home with son who takes care of her.  Daughter states patient wants home hospice    Hospice Consult received from MD    Daughter asks me to call her PCP to see what hospice company they use- but really doesn't have a hospice co preference.        09/09/19 1119   Discharge Assessment   Assessment Type Discharge Planning Assessment   Confirmed/corrected address and phone number on facesheet? Yes   Assessment information obtained from? Caregiver   Prior to hospitilization cognitive status: Unable to Assess   Prior to hospitalization functional status: Completely Dependent   Current cognitive status: Unable to Assess   Current Functional Status: Completely Dependent   Lives With child(erlinda), adult   Able to Return to Prior Arrangements yes   Is patient able to care for self after discharge? Yes   Patient's perception of discharge disposition home or selfcare   Readmission Within the Last 30 Days no previous admission in last 30 days   Patient currently being followed by outpatient case management? No   Patient currently receives any other outside agency services? No   Equipment Currently Used at Home walker, rolling   Do you have any problems affording any of your prescribed medications? No   Is the patient taking medications as prescribed? no   Does the patient have transportation home? No   Transportation Anticipated other (see comments)  (will need ambulance with oxygen for transportaiton to home)   Does the patient receive services at the [facility]? No   Discharge Plan A Home;Hospice/home   Discharge Plan B Home   DME Needed Upon Discharge  none     Viola Garcia RN, CCM, CMSRN  RN Transition Navigator  [phone]

## 2019-09-09 NOTE — PROGRESS NOTES
[facility] Hospital Medicine  Progress Note    Patient Name: Mattie Belle  MRN: [id number]  Patient Class: IP- Inpatient   Admission Date: 9/6/2019  Length of Stay: 3 days  Attending Physician: Severyn Yaroshevsky, MD  Primary Care Provider: Yunior Pagan MD    Subjective:     Principal Problem:NSTEMI (non-ST elevated myocardial infarction)    HPI:  89 y.o. female with a pertinent PMHx of Afib  (on apixaban and Plavix), CHB s/p PPM, diastolic heart failure (last echo EF 60% 2015),  HTN, HLD, and stroke (with residual right sided paralysis and dysarthria) presents to an OSH with AMS and nausea. Family member provides whole history - patient is sleeping and nonverbal. Caregiver notes she lives with her brother, is in a wheelchair, and does not walk or speak much due to her past stroke. For the past few days she was feeling tired and had an episode of nausea with one vomiting incidence. She did not complain of CP but does not speak much. She can understand her family when spoken to. No fevers, cough, diarrhea or other symptoms per family. She went to an OSH and CXR showed PNA and NSTEMI with an EKG no ST changes, trop elevated at 0.036, and an elevated BNP. Last echo was in 2015 with normal EF. No recent echo. She saw a cardiologist around a year ago. Cardiology was called and recommended a heparin drip and admission. She was started on Abx for a PNA and UTI. Family member notes she is DNR.     Interval History: No acute events overnight. Daughter and pt discussed transitioning to hospice care yesterday evening - both on board with plan. No other complaints at this time.     Review of Systems   Unable to complete 2/2 pt being mostly non-verbal.     Objective:     Vital Signs (Most Recent):  Temp: 96.3 °F (35.7 °C) (09/09/19 0750)  Pulse: 105 (09/09/19 0750)  Resp: 19 (09/09/19 0750)  BP: 136/87 (09/09/19 0750)  SpO2: (!) 88 % (09/09/19 0750) Vital Signs (24h Range):  Temp:  [96.3 °F (35.7 °C)-97.7  °F (36.5 °C)] 96.3 °F (35.7 °C)  Pulse:  [] 105  Resp:  [14-20] 19  SpO2:  [88 %-95 %] 88 %  BP: (114-136)/(56-87) 136/87     Weight: 66.7 kg (147 lb 0.8 oz)  Body mass index is 27.78 kg/m².    Intake/Output Summary (Last 24 hours) at 9/9/2019 0923  Last data filed at 9/8/2019 1900  Gross per 24 hour   Intake 0 ml   Output --   Net 0 ml      Physical Exam  Unable to complete 2/2 pt refusing physical exam.     Significant Labs:   Recent Labs   Lab 08/26/19  0833 09/06/19  1555 09/07/19  0603 09/08/19  0523 09/09/19  0511   BILITOT 0.7 0.9 0.5 0.5 0.4      Recent Labs   Lab 09/09/19  0511   *      K 5.0      CO2 25   BUN 43*   CREATININE 1.3   CALCIUM 9.7   MG 2.0      Recent Labs   Lab 09/08/19  0523 09/09/19  0511   WBC 7.78 7.56   HGB 11.5* 10.8*   HCT 37.3 35.2*    208     Recent Labs   Lab 09/08/19  0523 09/09/19  0511    139   K 5.1 5.0    104   CO2 27 25   * 162*   BUN 37* 43*   CREATININE 1.4 1.3   CALCIUM 9.9 9.7   PROT 6.5 6.0   ALBUMIN 2.9* 2.6*   BILITOT 0.5 0.4   ALKPHOS 82 79   AST 29 28   ALT 18 17   ANIONGAP 9 10   EGFRNONAA 33* 36*     Recent Labs   Lab 09/08/19  0523 09/09/19  0511   MG 1.8 2.0     Recent Labs   Lab 09/07/19  1139   POCTGLUCOSE 143*     Recent Labs   Lab 08/26/19  0833   TSH 2.779       Assessment/Plan:      * NSTEMI (non-ST elevated myocardial infarction)  - Troponin elevated on admit, trend x3 normalized (0.036 -> 0.027 -> 0.018)   - EKG x 3 without significant changes from initial EKG on presentation to OSH  - Home meds include Lipitor, Coreg, Plavix, Eliquis, Aldactone  - Initially held Coreg on admission 2/2 hypotension but restarted on 9/8 PM\  - CXR w/ enlarged heart and right PNA  - Cardiology consulted - recommended continuing medical therapy for ACS 2/2 pt not being candidate for invasive cardiac procedures      Pneumonia  - Pt afebrile with WBC 8.6 on admit  - CXR showed right middle and lower PNA   - Started on  azithromycin and rocephin   - Blood cultures NGTD   - Pt currently on 1L O2 NC - no respiratory distress      Acute cystitis without hematuria  - UCx (9/7) grew 10-50k CFU gram negative rods  - Urine gram stain shows gram positive and gram negative rods   - Blood cultures NGTD   - Currently on azithromycin and rocephin   - Will continue to monitor       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  - Pt with history of stroke in 2004 with residual right sided paralysis and dysarthria  - Per family, pt does not speak much 2/2 stroke  - Pt continues to be mostly non-verbal throughout hospital stay  - Passed bedside swallow study, currently tolerating adult regular diet      Right hemiparesis  - Pt with history of stroke in 2004 with residual right sided paralysis and dysarthria  - Current exam consistent with pt's baseline      Hypertension  - Pt hypotensive on admit, initially held Coreg and started pt on IVF  - Hypotension resolved, pt currently stable on home medications  - Coreg restarted on 9/8  - Continue to monitor       Chronic diastolic CHF (congestive heart failure)  - BNP elevated at 672  - Last echo 2015 EF 65%  - Echo on admission - results w/ EF of 55%, severe LA and RA enlargement, severe tricuspid regurgitation, and pulmonary hypertension  - Caregiver and patient do not want DNR - paperwork complete   - Fluids given for hypotension rather than pressors with plan to diuresis later if needed      Complete AV block  - Pacemaker in place  - HR wnl on admit  - Pt restarted on home medications - Coreg initially held on admit 2/2 hypotension, but restarted on 9/8       Permanent atrial fibrillation  - Pacemaker in place  - HR wnl on admit  - Pt restarted on home medications - Coreg initially held on admit 2/2 hypotension, but restarted on 9/8       Hyperlipidemia  - Pt stable on home medications      DNR (do not resuscitate)  - Paperwork complete and signed by Dr. Miner  - Discussing  hospice care with case management    VTE Risk Mitigation (From admission, onward)        Ordered     apixaban tablet 2.5 mg  2 times daily      09/07/19 1548     IP VTE HIGH RISK PATIENT  Once      09/06/19 2014     Place sequential compression device  Until discontinued      09/06/19 2014            Yaima Toribio MD  Department of Hospital Medicine   [facility]

## 2019-09-09 NOTE — ASSESSMENT & PLAN NOTE
- Pt with history of stroke in 2004 with residual right sided paralysis and dysarthria  - Per family, pt does not speak much 2/2 stroke  - Pt continues to be mostly non-verbal throughout hospital stay  - Passed bedside swallow study, currently tolerating adult regular diet

## 2019-09-09 NOTE — ASSESSMENT & PLAN NOTE
- Pt afebrile with WBC 8.6 on admit  - CXR showed right middle and lower PNA   - Started on azithromycin and rocephin   - Blood cultures NGTD   - Pt currently on 1L O2 NC - no respiratory distress

## 2019-09-09 NOTE — PLAN OF CARE
Spoke with walt with [facility]-they receved everything and nurse will admit patient at home when patient gets discharged.    Daughter at bedside and agreeable with discharge plan    Set up transportation with patient flow center for 530pm ambulance pickup       09/09/19 2683   Final Note   Assessment Type Final Discharge Note   Anticipated Discharge Disposition [facility] Follow Up  Appt(s) scheduled? No   Discharge plans and expectations educations in teach back method with documentation complete? Yes   Right Care Referral Info   Post Acute Recommendation Other   Referral Type Home Hospice   Facility Name [facility]     Viola Garcia RN, CCM, CMSRN  RN Transition Navigator  [phone]

## 2019-09-09 NOTE — NURSING
Discharge instructions, prescriptions, and educational printouts were throughly discussed with the patient's daughter. The patient was going home with hospice and was informed that transportation had been arranged for 1730. All questions or concerns were addressed.

## 2019-09-09 NOTE — TELEPHONE ENCOUNTER
[employer] search engine shows patient is overt the income limit. Attempted to contact patient no answer,.

## 2019-09-09 NOTE — ASSESSMENT & PLAN NOTE
- UCx (9/7) grew 10-50k CFU gram negative rods  - Urine gram stain shows gram positive and gram negative rods   - Blood cultures NGTD   - Currently on azithromycin and rocephin   - Will continue to monitor

## 2019-09-09 NOTE — ASSESSMENT & PLAN NOTE
- Pt with history of stroke in 2004 with residual right sided paralysis and dysarthria  - Current exam consistent with pt's baseline

## 2019-09-09 NOTE — ASSESSMENT & PLAN NOTE
- Troponin elevated on admit, trend x3 normalized (0.036 -> 0.027 -> 0.018)   - EKG x 3 without significant changes from initial EKG on presentation to OSH  - Home meds include Lipitor, Coreg, Plavix, Eliquis, Aldactone  - Initially held Coreg on admission 2/2 hypotension but restarted on 9/8 PM\  - CXR w/ enlarged heart and right PNA  - Cardiology consulted - recommended continuing medical therapy for ACS 2/2 pt not being candidate for invasive cardiac procedures

## 2019-09-09 NOTE — PLAN OF CARE
Daughter OK with [facility]. Sent hospice referral and packet via [employer].       09/09/19 1205   Post-Acute Status   Post-Acute Authorization Placement   Post-Acute Placement Status Referrals Sent     Viola Garcia RN, CCM, CMSRN  RN Transition Navigator  [phone]

## 2019-09-09 NOTE — NURSING
Pt AAO  VSS NAD.   Tele box removed. IV removed.   Educated pt on medications, when to take, how to take, side effects  Pt verbalized understanding.  Hydroxyzine administered before being transported.  Transported via stretcher by transportation.

## 2019-09-09 NOTE — PLAN OF CARE
Received call from Shelley with [facility] and she will be meeting with daughter today at 3pm       09/09/19 1342   Post-Acute Status   Post-Acute Authorization Placement   Post-Acute Placement Status Pending Service Contract     Viola Garcia, RN, CCM, CMSRN  RN Transition Navigator  [phone]

## 2019-09-09 NOTE — SUBJECTIVE & OBJECTIVE
Interval History:     Review of Systems  Objective:     Vital Signs (Most Recent):  Temp: 96.3 °F (35.7 °C) (09/09/19 0750)  Pulse: 105 (09/09/19 0750)  Resp: 19 (09/09/19 0750)  BP: 136/87 (09/09/19 0750)  SpO2: (!) 88 % (09/09/19 0750) Vital Signs (24h Range):  Temp:  [96.3 °F (35.7 °C)-97.7 °F (36.5 °C)] 96.3 °F (35.7 °C)  Pulse:  [] 105  Resp:  [14-20] 19  SpO2:  [88 %-95 %] 88 %  BP: (114-136)/(56-87) 136/87     Weight: 66.7 kg (147 lb 0.8 oz)  Body mass index is 27.78 kg/m².    Intake/Output Summary (Last 24 hours) at 9/9/2019 1110  Last data filed at 9/8/2019 1900  Gross per 24 hour   Intake 0 ml   Output --   Net 0 ml      Physical Exam    Significant Labs: {Results:78469}    Significant Imaging: {Imaging Review:12898}

## 2019-09-09 NOTE — ASSESSMENT & PLAN NOTE
- Pt hypotensive on admit, initially held Coreg and started pt on IVF  - Hypotension resolved, pt currently stable on home medications  - Coreg restarted on 9/8  - Continue to monitor

## 2019-09-09 NOTE — HPI
89 y.o. female with a pertinent PMHx of Afib (on apixaban and Plavix), CHB s/p PPM, diastolic heart failure (last echo EF 60% 2015),  HTN, HLD, and stroke (with residual right sided paralysis and dysarthria) presents to an OSH with AMS and nausea. Family member provides whole history - patient is sleeping and nonverbal. Caregiver notes she lives with her brother, is in a wheelchair, and does not walk or speak much due to her past stroke. For the past few days she was feeling tired and had an episode of nausea with one vomiting incidence. She did not complain of CP but does not speak much. She can understand her family when spoken to. No fevers, cough, diarrhea or other symptoms per family. She went to an OSH and CXR showed PNA and NSTEMI with an EKG no ST changes, trop elevated at 0.036, and an elevated BNP. Last echo was in 2015 with normal EF. No recent echo. She saw a cardiologist around a year ago. Cardiology was called and recommended a heparin drip and admission. She was started on Abx for a PNA and UTI. Family member notes she is DNR.

## 2019-09-09 NOTE — SUBJECTIVE & OBJECTIVE
Interval History: No acute events overnight. Daughter and pt discussed transitioning to hospice care yesterday evening - both on board with plan. No other complaints at this time.     Review of Systems   Unable to complete 2/2 pt being mostly non-verbal.     Objective:     Vital Signs (Most Recent):  Temp: 96.3 °F (35.7 °C) (09/09/19 0750)  Pulse: 105 (09/09/19 0750)  Resp: 19 (09/09/19 0750)  BP: 136/87 (09/09/19 0750)  SpO2: (!) 88 % (09/09/19 0750) Vital Signs (24h Range):  Temp:  [96.3 °F (35.7 °C)-97.7 °F (36.5 °C)] 96.3 °F (35.7 °C)  Pulse:  [] 105  Resp:  [14-20] 19  SpO2:  [88 %-95 %] 88 %  BP: (114-136)/(56-87) 136/87     Weight: 66.7 kg (147 lb 0.8 oz)  Body mass index is 27.78 kg/m².    Intake/Output Summary (Last 24 hours) at 9/9/2019 0923  Last data filed at 9/8/2019 1900  Gross per 24 hour   Intake 0 ml   Output --   Net 0 ml      Physical Exam  Unable to complete 2/2 pt refusing physical exam.     Significant Labs:   Recent Labs   Lab 08/26/19  0833 09/06/19  1555 09/07/19  0603 09/08/19  0523 09/09/19  0511   BILITOT 0.7 0.9 0.5 0.5 0.4      Recent Labs   Lab 09/09/19  0511   *      K 5.0      CO2 25   BUN 43*   CREATININE 1.3   CALCIUM 9.7   MG 2.0      Recent Labs   Lab 09/08/19 0523 09/09/19  0511   WBC 7.78 7.56   HGB 11.5* 10.8*   HCT 37.3 35.2*    208     Recent Labs   Lab 09/08/19 0523 09/09/19  0511    139   K 5.1 5.0    104   CO2 27 25   * 162*   BUN 37* 43*   CREATININE 1.4 1.3   CALCIUM 9.9 9.7   PROT 6.5 6.0   ALBUMIN 2.9* 2.6*   BILITOT 0.5 0.4   ALKPHOS 82 79   AST 29 28   ALT 18 17   ANIONGAP 9 10   EGFRNONAA 33* 36*     Recent Labs   Lab 09/08/19  0523 09/09/19  0511   MG 1.8 2.0     Recent Labs   Lab 09/07/19  1139   POCTGLUCOSE 143*     Recent Labs   Lab 08/26/19  0833   TSH 2.779

## 2019-09-09 NOTE — ASSESSMENT & PLAN NOTE
- Pacemaker in place  - HR wnl on admit  - Pt restarted on home medications - Coreg initially held on admit 2/2 hypotension, but restarted on 9/8

## 2019-09-12 LAB
BACTERIA BLD CULT: NORMAL
BACTERIA BLD CULT: NORMAL

## 2019-09-17 ENCOUNTER — PATIENT MESSAGE (OUTPATIENT)
Dept: INTERNAL MEDICINE | Facility: CLINIC | Age: 84
End: 2019-09-17

## 2019-09-17 ENCOUNTER — TELEPHONE (OUTPATIENT)
Dept: CARDIOLOGY | Facility: HOSPITAL | Age: 84
End: 2019-09-17

## 2019-09-17 NOTE — TELEPHONE ENCOUNTER
----- Message from Vira Villa MA sent at 9/17/2019  8:41 AM CDT -----  Contact: Patient's daughter Radha Howard      ----- Message -----  From: Ginny Ding  Sent: 9/17/2019   8:37 AM  To: [facility] Arrhythmia Clinical Support Staff    The Pt's daughter is calling to report that the Pt has passed on 9/16/2019. She wants to know what should she do with her [employer] home device. Please call her back @ [phone]. Thanks, Ginny

## 2019-09-17 NOTE — TELEPHONE ENCOUNTER
"NP daughter :  "  I always enjoyed taking care of her .  I am saddened to know about her passing   May God bless her soul.  Her smile will always live with me.  It was my honour to take care of her : Thank you for entrusting her care to me."    Yunior Pagan      "

## 2019-09-17 NOTE — TELEPHONE ENCOUNTER
Advised patients daughter to either dispose of monitor (batteries should be removed) or she can return to main campus.
